# Patient Record
Sex: FEMALE | Race: WHITE | NOT HISPANIC OR LATINO | Employment: OTHER | URBAN - NONMETROPOLITAN AREA
[De-identification: names, ages, dates, MRNs, and addresses within clinical notes are randomized per-mention and may not be internally consistent; named-entity substitution may affect disease eponyms.]

---

## 2017-11-20 ENCOUNTER — TRANSCRIBE ORDERS (OUTPATIENT)
Dept: ADMINISTRATIVE | Facility: HOSPITAL | Age: 67
End: 2017-11-20

## 2017-11-20 DIAGNOSIS — Z12.31 ENCOUNTER FOR SCREENING MAMMOGRAM FOR MALIGNANT NEOPLASM OF BREAST: Primary | ICD-10-CM

## 2018-01-08 ENCOUNTER — HOSPITAL ENCOUNTER (OUTPATIENT)
Dept: MAMMOGRAPHY | Facility: HOSPITAL | Age: 68
Discharge: HOME | End: 2018-01-08
Attending: FAMILY MEDICINE | Admitting: FAMILY MEDICINE

## 2018-01-08 DIAGNOSIS — Z12.31 ENCOUNTER FOR SCREENING MAMMOGRAM FOR MALIGNANT NEOPLASM OF BREAST: ICD-10-CM

## 2018-01-08 PROCEDURE — 77063 BREAST TOMOSYNTHESIS BI: CPT

## 2018-01-08 PROCEDURE — 77067 SCR MAMMO BI INCL CAD: CPT

## 2019-09-18 ENCOUNTER — TRANSCRIBE ORDERS (OUTPATIENT)
Dept: ADMINISTRATIVE | Facility: HOSPITAL | Age: 69
End: 2019-09-18

## 2019-09-18 DIAGNOSIS — Z12.31 ENCOUNTER FOR SCREENING MAMMOGRAM FOR MALIGNANT NEOPLASM OF BREAST: Primary | ICD-10-CM

## 2019-09-19 ENCOUNTER — HOSPITAL ENCOUNTER (OUTPATIENT)
Dept: MAMMOGRAPHY | Facility: HOSPITAL | Age: 69
Discharge: HOME | End: 2019-09-19
Admitting: FAMILY MEDICINE

## 2019-09-19 DIAGNOSIS — Z12.31 ENCOUNTER FOR SCREENING MAMMOGRAM FOR MALIGNANT NEOPLASM OF BREAST: ICD-10-CM

## 2019-09-19 PROCEDURE — 77063 BREAST TOMOSYNTHESIS BI: CPT

## 2019-09-19 PROCEDURE — 77067 SCR MAMMO BI INCL CAD: CPT

## 2020-07-27 LAB
ALBUMIN SERPL-MCNC: 4.2 G/DL (ref 3.5–5.2)
ALP BLD-CCNC: 87 U/L (ref 35–104)
ALT SERPL-CCNC: 18 U/L (ref 5–33)
ANION GAP SERPL CALCULATED.3IONS-SCNC: 13 MMOL/L (ref 7–19)
AST SERPL-CCNC: 16 U/L (ref 5–32)
BASOPHILS ABSOLUTE: 0.1 K/UL (ref 0–0.2)
BASOPHILS RELATIVE PERCENT: 0.8 % (ref 0–1)
BILIRUB SERPL-MCNC: 0.3 MG/DL (ref 0.2–1.2)
BUN BLDV-MCNC: 20 MG/DL (ref 8–23)
CALCIUM SERPL-MCNC: 9.4 MG/DL (ref 8.8–10.2)
CHLORIDE BLD-SCNC: 103 MMOL/L (ref 98–111)
CHOLESTEROL, TOTAL: 139 MG/DL (ref 160–199)
CO2: 25 MMOL/L (ref 22–29)
CREAT SERPL-MCNC: 1 MG/DL (ref 0.5–0.9)
EOSINOPHILS ABSOLUTE: 0.2 K/UL (ref 0–0.6)
EOSINOPHILS RELATIVE PERCENT: 3.3 % (ref 0–5)
GFR AFRICAN AMERICAN: >59
GFR NON-AFRICAN AMERICAN: 55
GLUCOSE BLD-MCNC: 109 MG/DL (ref 74–109)
HCT VFR BLD CALC: 40.9 % (ref 37–47)
HDLC SERPL-MCNC: 43 MG/DL (ref 65–121)
HEMOGLOBIN: 13.2 G/DL (ref 12–16)
IMMATURE GRANULOCYTES #: 0 K/UL
LDL CHOLESTEROL CALCULATED: 63 MG/DL
LYMPHOCYTES ABSOLUTE: 2.6 K/UL (ref 1.1–4.5)
LYMPHOCYTES RELATIVE PERCENT: 35.5 % (ref 20–40)
MCH RBC QN AUTO: 29.8 PG (ref 27–31)
MCHC RBC AUTO-ENTMCNC: 32.3 G/DL (ref 33–37)
MCV RBC AUTO: 92.3 FL (ref 81–99)
MONOCYTES ABSOLUTE: 0.8 K/UL (ref 0–0.9)
MONOCYTES RELATIVE PERCENT: 11.4 % (ref 0–10)
NEUTROPHILS ABSOLUTE: 3.5 K/UL (ref 1.5–7.5)
NEUTROPHILS RELATIVE PERCENT: 48.7 % (ref 50–65)
PDW BLD-RTO: 12.4 % (ref 11.5–14.5)
PLATELET # BLD: 252 K/UL (ref 130–400)
PMV BLD AUTO: 10.4 FL (ref 9.4–12.3)
POTASSIUM SERPL-SCNC: 4 MMOL/L (ref 3.5–5)
RBC # BLD: 4.43 M/UL (ref 4.2–5.4)
SODIUM BLD-SCNC: 141 MMOL/L (ref 136–145)
T4 FREE: 1.25 NG/DL (ref 0.93–1.7)
TOTAL PROTEIN: 6.7 G/DL (ref 6.6–8.7)
TRIGL SERPL-MCNC: 167 MG/DL (ref 0–149)
TSH SERPL DL<=0.05 MIU/L-ACNC: 0.9 UIU/ML (ref 0.27–4.2)
VITAMIN D 25-HYDROXY: 35.9 NG/ML
WBC # BLD: 7.2 K/UL (ref 4.8–10.8)

## 2020-08-24 LAB
AMPHETAMINE SCREEN, URINE: NEGATIVE
BARBITURATE SCREEN URINE: NEGATIVE
BENZODIAZEPINE SCREEN, URINE: NEGATIVE
CANNABINOID SCREEN URINE: NEGATIVE
COCAINE METABOLITE SCREEN URINE: NEGATIVE
Lab: NORMAL
OPIATE SCREEN URINE: NEGATIVE

## 2021-01-16 ENCOUNTER — APPOINTMENT (OUTPATIENT)
Dept: VACCINE CLINIC | Facility: HOSPITAL | Age: 71
End: 2021-01-16

## 2021-01-29 ENCOUNTER — TRANSCRIBE ORDERS (OUTPATIENT)
Dept: ADMINISTRATIVE | Facility: HOSPITAL | Age: 71
End: 2021-01-29

## 2021-01-29 DIAGNOSIS — Z12.31 ENCOUNTER FOR SCREENING MAMMOGRAM FOR MALIGNANT NEOPLASM OF BREAST: Primary | ICD-10-CM

## 2021-02-02 ENCOUNTER — HOSPITAL ENCOUNTER (OUTPATIENT)
Dept: MAMMOGRAPHY | Facility: HOSPITAL | Age: 71
Discharge: HOME | End: 2021-02-02
Admitting: FAMILY MEDICINE

## 2021-02-02 DIAGNOSIS — Z12.31 ENCOUNTER FOR SCREENING MAMMOGRAM FOR MALIGNANT NEOPLASM OF BREAST: ICD-10-CM

## 2021-02-02 PROCEDURE — 77067 SCR MAMMO BI INCL CAD: CPT

## 2021-02-02 PROCEDURE — 77063 BREAST TOMOSYNTHESIS BI: CPT

## 2021-02-13 ENCOUNTER — APPOINTMENT (OUTPATIENT)
Dept: VACCINE CLINIC | Facility: HOSPITAL | Age: 71
End: 2021-02-13

## 2021-03-05 ENCOUNTER — VIRTUAL VISIT (OUTPATIENT)
Dept: GASTROENTEROLOGY | Age: 71
End: 2021-03-05
Payer: COMMERCIAL

## 2021-03-05 DIAGNOSIS — Z86.010 HISTORY OF COLONIC POLYPS: Primary | ICD-10-CM

## 2021-03-05 PROCEDURE — 99203 OFFICE O/P NEW LOW 30 MIN: CPT | Performed by: NURSE PRACTITIONER

## 2021-03-05 ASSESSMENT — ENCOUNTER SYMPTOMS
BLOOD IN STOOL: 0
NAUSEA: 0
COUGH: 0
TROUBLE SWALLOWING: 0
ANAL BLEEDING: 0
RECTAL PAIN: 0
DIARRHEA: 0
BACK PAIN: 0
SHORTNESS OF BREATH: 0
ABDOMINAL DISTENTION: 0
VOMITING: 0
CONSTIPATION: 0
ABDOMINAL PAIN: 0
VOICE CHANGE: 0
SORE THROAT: 0

## 2021-03-05 NOTE — PROGRESS NOTES
3/5/2021     Pt location: pt home    TELEHEALTH EVALUATION -- Audio/Visual (During [id number]-74 public health emergency)    HPI:    Carson Houston (:  1950) has requested an audio/video evaluation for the following concern(s):    Pt made an appt to schedule a surveillance colonoscopy. Has a personal hx of colon polyps. Pt reports that about a month ago she had an episode of lower abd pain that came on all the sudden, thinks it was related to something she ate, it lasted about 4 days, more of an uncomfortable feeling than a pain, resolved on its own with no intervention. No other GI lower GI complaints. Has occasional heartburn well controlled with TUMs prn, nothing daily or persistent. No other UGI complaints. GI scope reports in history per MA per OV policy, I reviewed this. Review of Systems   Constitutional: Negative for appetite change, fatigue, fever and unexpected weight change. HENT: Negative for sore throat, trouble swallowing and voice change. Respiratory: Negative for cough and shortness of breath. Cardiovascular: Negative for chest pain, palpitations and leg swelling. Gastrointestinal: Negative for abdominal distention, abdominal pain, anal bleeding, blood in stool, constipation, diarrhea, nausea, rectal pain and vomiting. Genitourinary: Negative for hematuria. Musculoskeletal: Negative for arthralgias, back pain and neck pain. Neurological: Negative for dizziness, weakness, light-headedness and headaches. Psychiatric/Behavioral: Negative for dysphoric mood and sleep disturbance. The patient is not nervous/anxious. All other systems reviewed and are negative. Prior to Visit Medications    Medication Sig Taking? Authorizing Provider   HYDROcodone-acetaminophen (LORTAB)  MG per tablet Take 1 tablet by mouth every 6 hours as needed for Pain. Historical Provider, MD   losartan (COZAAR) 100 MG tablet Take 100 mg by mouth daily.   Historical Provider, MD levothyroxine (SYNTHROID) 100 MCG tablet Take 100 mcg by mouth Daily. Historical Provider, MD   rosuvastatin (CRESTOR) 5 MG tablet Take 5 mg by mouth daily. Historical Provider, MD   Cetirizine HCl (ZYRTEC ALLERGY) 10 MG CAPS Take  by mouth. Historical Provider, MD   Cyanocobalamin (VITAMIN B 12) 100 MCG LOZG Take by mouth daily   Historical Provider, MD       Social History     Tobacco Use    Smoking status: Former Smoker     Quit date: 1990     Years since quittin.6    Smokeless tobacco: Never Used   Substance Use Topics    Alcohol use: Yes     Comment: social    Drug use: Never        PHYSICAL EXAMINATION:  [ INSTRUCTIONS:  \"[x]\" Indicates a positive item  \"[]\" Indicates a negative item  -- DELETE ALL ITEMS NOT EXAMINED]  Vital Signs: (As obtained by patient/caregiver or practitioner observation)    Blood pressure-  Heart rate-    Respiratory rate-    Temperature-  Pulse oximetry-     Constitutional: [x] Appears well-developed and well-nourished [x] No apparent distress      [] Abnormal-   Mental status  [x] Alert and awake  [x] Oriented to person/place/time [x]Able to follow commands      Eyes:  EOM    [x]  Normal  [] Abnormal-  Sclera  [x]  Normal  [] Abnormal -         Discharge [x]  None visible  [] Abnormal -    HENT:   [x] Normocephalic, atraumatic.   [] Abnormal   [x] Mouth/Throat: Mucous membranes are moist.     External Ears [x] Normal  [] Abnormal-     Neck: [x] No visualized mass     Pulmonary/Chest: [x] Respiratory effort normal.  [x] No visualized signs of difficulty breathing or respiratory distress        [] Abnormal-      Musculoskeletal:   [] Normal gait with no signs of ataxia         [x] Normal range of motion of neck        [] Abnormal-       Neurological:        [x] No Facial Asymmetry (Cranial nerve 7 motor function) (limited exam to video visit)          [x] No gaze palsy        [] Abnormal-         Skin:        [x] No significant exanthematous lesions or discoloration noted

## 2021-03-05 NOTE — PATIENT INSTRUCTIONS
You are going to have a colonoscopy and here are some instructions: You will be given specific directions regarding restrictions to diet and bowel prep instructions including laxatives. Please read these instructions one week prior to your scheduled procedure to ensure that you are prepared. Follow prep instructions provided for bowel prep. Take all of the bowel prep as directed. If you are having problems with nausea, stop your prep for 30-45 min to allow the nausea to subside before resuming your prep. Nothing to eat or drink after midnight the day of the procedure EXCEPT:  PLEASE TAKE MEDICATION(S) FOR HIGH BLOOD PRESSURE, THYROID, SEIZURES, AND HEART THE MORNING OF THE PROCEDURE WITH A SIP OF WATER  AT LEAST 2 HOURS PRIOR TO ARRIVAL TIME.   YOU MAY ALSO TAKE ANY INHALERS YOU ARE PRESCRIBED. You will not be able to drive for 24 hours after the procedure due to sedation. Bring a  with you the day of the procedure. No aspirin, ibuprofen, naproxen, fish oil or vitamin E for 5 days before procedure. If you are on blood thinners, clearance from the prescribing physician will be obtained before your procedure is scheduled. Increased [email or web address] may be associated with discontinuation of your blood thinner and include, but not limited to, stroke, TIA, or cardiac event. If polyps are removed during the procedure they will be sent to a pathologist for analysis. You will be notified by mail of the pathology results in 2-3 weeks. Your physician may also schedule a follow up appointment with the nurse practitioner to discuss pathology, symptoms or to check if you have had any problems related to your procedure. If you prefer not to return to the office after your procedure please discuss this with your physician on the day of your colonoscopy. Final recommendations are based on the pathologist report. Your diet before a colonoscopy bowel preparation is very important to ensure a successful colon exam. It is recommended to consider certain changes to your diet three to four days prior to the procedure. Remember that your bowels need to be empty for the exam.    What foods are good to eat? Cut down on heavy solid foods three to four days before the procedure and start introducing lighter meals to your diet. The following food suggestions are a good part of your diet before a colonoscopy bowel preparation. Light meat that is easily digestible such as chicken (without the skin)   Potatoes without skin   Cheese   Eggs   A light meal of steamed white fish   Light clear soups    Foods and drinks to avoid  Avoid foods that contain too much fiber. Stay clear of dark colored beverages. They can stick to the walls of the digestive tract and make it difficult to differentiate from blood. Some of these foods are:  Red meat, rice, nuts and vegetables   Milk, other milk based fluids and cream   Most fruit and puddings   Whole grain pasta   Cereals, bran and seeds   Colored beverages, especially those that are red or purple in color   Red colored Jell-O   On the day before the colonoscopy, continue to drink plenty of clear fluids. It is important   to keep yourself hydrated before the exam.     Please follow all instructions as provided for cleansing the bowel. Failure to have an adequately prepped colon may cause you to have incomplete exam with further testing required.

## 2021-03-19 ENCOUNTER — OFFICE VISIT (OUTPATIENT)
Age: 71
End: 2021-03-19

## 2021-03-19 VITALS — OXYGEN SATURATION: 96 % | HEART RATE: 72 BPM

## 2021-03-19 DIAGNOSIS — Z11.59 SCREENING FOR VIRAL DISEASE: Primary | ICD-10-CM

## 2021-03-19 LAB — SARS-COV-2, PCR: NOT DETECTED

## 2021-03-19 PROCEDURE — 99999 PR OFFICE/OUTPT VISIT,PROCEDURE ONLY: CPT | Performed by: NURSE PRACTITIONER

## 2021-03-23 ENCOUNTER — APPOINTMENT (OUTPATIENT)
Dept: OPERATING ROOM | Age: 71
End: 2021-03-23

## 2021-03-23 ENCOUNTER — HOSPITAL ENCOUNTER (OUTPATIENT)
Age: 71
Setting detail: SPECIMEN
Discharge: HOME | End: 2021-03-23
Payer: COMMERCIAL

## 2021-03-23 ENCOUNTER — ANESTHESIA EVENT (OUTPATIENT)
Dept: OPERATING ROOM | Age: 71
End: 2021-03-23

## 2021-03-23 ENCOUNTER — HOSPITAL ENCOUNTER (OUTPATIENT)
Age: 71
Setting detail: OUTPATIENT SURGERY
Discharge: HOME | End: 2021-03-23
Attending: INTERNAL MEDICINE | Admitting: INTERNAL MEDICINE
Payer: COMMERCIAL

## 2021-03-23 ENCOUNTER — ANESTHESIA (OUTPATIENT)
Dept: OPERATING ROOM | Age: 71
End: 2021-03-23

## 2021-03-23 VITALS
OXYGEN SATURATION: 99 % | WEIGHT: 192 LBS | TEMPERATURE: 97.6 F | HEIGHT: 66 IN | BODY MASS INDEX: 30.86 KG/M2 | DIASTOLIC BLOOD PRESSURE: 59 MMHG | RESPIRATION RATE: 20 BRPM | HEART RATE: 59 BPM | SYSTOLIC BLOOD PRESSURE: 107 MMHG

## 2021-03-23 VITALS — OXYGEN SATURATION: 99 % | SYSTOLIC BLOOD PRESSURE: 93 MMHG | DIASTOLIC BLOOD PRESSURE: 44 MMHG

## 2021-03-23 PROCEDURE — 45380 COLONOSCOPY AND BIOPSY: CPT | Performed by: INTERNAL MEDICINE

## 2021-03-23 PROCEDURE — 45380 COLONOSCOPY AND BIOPSY: CPT

## 2021-03-23 PROCEDURE — 88305 TISSUE EXAM BY PATHOLOGIST: CPT

## 2021-03-23 RX ORDER — M-VIT,TX,IRON,MINS/CALC/FOLIC 27MG-0.4MG
1 TABLET ORAL DAILY
COMMUNITY

## 2021-03-23 RX ORDER — PROPOFOL 10 MG/ML
INJECTION, EMULSION INTRAVENOUS PRN
Status: DISCONTINUED | OUTPATIENT
Start: 2021-03-23 | End: 2021-03-23 | Stop reason: SDUPTHER

## 2021-03-23 RX ORDER — LIDOCAINE HYDROCHLORIDE 10 MG/ML
INJECTION, SOLUTION INFILTRATION; PERINEURAL PRN
Status: DISCONTINUED | OUTPATIENT
Start: 2021-03-23 | End: 2021-03-23 | Stop reason: SDUPTHER

## 2021-03-23 RX ORDER — SODIUM CHLORIDE 9 MG/ML
INJECTION, SOLUTION INTRAVENOUS CONTINUOUS
Status: DISCONTINUED | OUTPATIENT
Start: 2021-03-23 | End: 2021-03-23 | Stop reason: HOSPADM

## 2021-03-23 RX ADMIN — LIDOCAINE HYDROCHLORIDE 30 MG: 10 INJECTION, SOLUTION INFILTRATION; PERINEURAL at 08:55

## 2021-03-23 RX ADMIN — SODIUM CHLORIDE: 9 INJECTION, SOLUTION INTRAVENOUS at 08:33

## 2021-03-23 RX ADMIN — PROPOFOL 150 MG: 10 INJECTION, EMULSION INTRAVENOUS at 08:55

## 2021-03-23 NOTE — H&P
Patient Name: Joel Guzmán  : 1950  MRN: [id number]  DATE: 21    Allergies: No Known Allergies     ENDOSCOPY  History and Physical    Procedure:    [] Diagnostic Colonoscopy       [] Screening Colonoscopy  [] EGD      [] ERCP      [] EUS       [] Other    [x] Previous office notes/History and Physical reviewed from the patients chart. Please see EMR for further details of HPI. I have examined the patient's status immediately prior to the procedure and:      Indications/HPI:    []Abdominal Pain   []Cancer- GI/Lung     []Fhx of colon CA/polyps  []History of Polyps  []Barretts            []Melena  []Abnormal Imaging              []Dysphagia              []Persistent Pneumonia   []Anemia                            []Food Impaction        [x]History of Polyps  [] GI Bleed             []Pulmonary nodule/Mass   []Change in bowel habits []Heartburn/Reflux  []Rectal Bleed (BRBPR)  []Chest Pain - Non Cardiac []Heme (+) Stool []Ulcers  []Constipation  []Hemoptysis  []Varices  []Diarrhea  []Hypoxemia    []Nausea/Vomiting   [x]Screening   []Crohns/Colitis  []Other:     Anesthesia:   [x] MAC [] Moderate Sedation   [] General   [] None     ROS: 12 pt Review of Symptoms was negative unless mentioned above    Medications:   Prior to Admission medications    Medication Sig Start Date End Date Taking? Authorizing Provider   Multiple Vitamins-Minerals (THERAPEUTIC MULTIVITAMIN-MINERALS) tablet Take 1 tablet by mouth daily   Yes Historical Provider, MD   HYDROcodone-acetaminophen (LORTAB)  MG per tablet Take 1 tablet by mouth every 6 hours as needed for Pain. Historical Provider, MD   losartan (COZAAR) 100 MG tablet Take 100 mg by mouth daily. Historical Provider, MD   levothyroxine (SYNTHROID) 100 MCG tablet Take 100 mcg by mouth Daily. Historical Provider, MD   rosuvastatin (CRESTOR) 5 MG tablet Take 5 mg by mouth daily.     Historical Provider, MD   Cetirizine HCl (ZYRTEC ALLERGY) 10 MG CAPS Take  by 2

## 2021-03-23 NOTE — ANESTHESIA POSTPROCEDURE EVALUATION
Department of Anesthesiology  Postprocedure Note    Patient: Zhanna Willams  MRN: [id number]  YOB: 1950  Date of evaluation: 3/23/2021  Time:  9:11 AM     Procedure Summary     Date: 03/23/21 Room / Location: [facility] ENDO 02 / [address] [place] [address]    Anesthesia Start: 3128 Anesthesia Stop:     Procedure: COLONOSCOPY POLYPECTOMY SNARE/COLD BIOPSY (N/A Abdomen) Diagnosis: (SCREENING)    Surgeons: Ravindra Jaffe MD Responsible Provider: DOUG Tatum CRNA    Anesthesia Type: Not recorded ASA Status: Not recorded          Anesthesia Type: No value filed. Lee Phase I:      Lee Phase II:      Last vitals: Reviewed and per EMR flowsheets.        Anesthesia Post Evaluation    Patient location during evaluation: bedside  Patient participation: complete - patient participated  Level of consciousness: sleepy but conscious  Airway patency: patent  Nausea & Vomiting: no nausea and no vomiting  Complications: no  Cardiovascular status: blood pressure returned to baseline  Respiratory status: acceptable, room air and spontaneous ventilation  Hydration status: euvolemic

## 2021-03-23 NOTE — ANESTHESIA PRE PROCEDURE
Department of Anesthesiology  Preprocedure Note       Name:  Edwin Renee   Age:  65 y.o.  :  1950                                          MRN:  [id number]         Date:  3/23/2021      Surgeon: Dania Rosenthal):  Simin Porter MD    Procedure: Procedure(s):  COLONOSCOPY POLYPECTOMY SNARE/COLD BIOPSY    Medications prior to admission:   Prior to Admission medications    Medication Sig Start Date End Date Taking? Authorizing Provider   Multiple Vitamins-Minerals (THERAPEUTIC MULTIVITAMIN-MINERALS) tablet Take 1 tablet by mouth daily   Yes Historical Provider, MD   HYDROcodone-acetaminophen (LORTAB)  MG per tablet Take 1 tablet by mouth every 6 hours as needed for Pain. Historical Provider, MD   losartan (COZAAR) 100 MG tablet Take 100 mg by mouth daily. Historical Provider, MD   levothyroxine (SYNTHROID) 100 MCG tablet Take 100 mcg by mouth Daily. Historical Provider, MD   rosuvastatin (CRESTOR) 5 MG tablet Take 5 mg by mouth daily. Historical Provider, MD   Cetirizine HCl (ZYRTEC ALLERGY) 10 MG CAPS Take  by mouth.     Historical Provider, MD   Cyanocobalamin (VITAMIN B 12) 100 MCG LOZG Take by mouth daily     Historical Provider, MD       Current medications:    Current Facility-Administered Medications   Medication Dose Route Frequency Provider Last Rate Last Admin    0.9 % sodium chloride infusion   Intravenous Continuous Simin Porter  mL/hr at 21 0833 New Bag at 21 0930       Allergies:  No Known Allergies    Problem List:    Patient Active Problem List   Diagnosis Code    Diverticulosis K57.90    History of colonic polyps Z86.010       Past Medical History:        Diagnosis Date    Chronic kidney disease     Colon polyps     Constipation     Diverticulosis     Hyperlipidemia     Hypertension     Hypothyroidism        Past Surgical History:        Procedure Laterality Date    [place]    COLONOSCOPY  2014 Kristina:  HP,  5y recall    JOINT REPLACEMENT Left     2014 hip    TUBAL LIGATION         Social History:    Social History     Tobacco Use    Smoking status: Former Smoker     Quit date: 1990     Years since quittin.7    Smokeless tobacco: Never Used   Substance Use Topics    Alcohol use: Not Currently     Comment: social                                Counseling given: Not Answered      Vital Signs (Current):   Vitals:    21 0830   BP: (!) 120/55   Pulse: 61   Resp: 20   Temp: 97.6 °F (36.4 °C)   TempSrc: Temporal   SpO2: 100%   Weight: 192 lb (87.1 kg)   Height: 5' 6\" (1.676 m)                                              BP Readings from Last 3 Encounters:   21 (!) 120/55   21 (!) 93/44   14 130/82       NPO Status: Time of last liquid consumption: 0500                        Time of last solid consumption: 1800                        Date of last liquid consumption: 21                        Date of last solid food consumption: 21    BMI:   Wt Readings from Last 3 Encounters:   21 192 lb (87.1 kg)   14 172 lb 4.8 oz (78.2 kg)     Body mass index is 30.99 kg/m². CBC:   Lab Results   Component Value Date    WBC 7.2 2020    RBC 4.43 2020    HGB 13.2 2020    HCT 40.9 2020    MCV 92.3 2020    RDW 12.4 2020     2020       CMP:   Lab Results   Component Value Date     2020    K 4.0 2020     2020    CO2 25 2020    BUN 20 2020    CREATININE 1.0 2020    GFRAA >59 2020    LABGLOM 55 2020    GLUCOSE 109 2020    PROT 6.7 2020    CALCIUM 9.4 2020    BILITOT 0.3 2020    ALKPHOS 87 2020    AST 16 2020    ALT 18 2020       POC Tests: No results for input(s): POCGLU, POCNA, POCK, POCCL, POCBUN, POCHEMO, POCHCT in the last 72 hours.     Coags:   Lab Results   Component Value Date    PROTIME 14.1 2014    INR 1.14 02/20/2014       HCG (If Applicable): No results found for: PREGTESTUR, PREGSERUM, HCG, HCGQUANT     ABGs: No results found for: PHART, PO2ART, BEW4MXA, MXK6JJX, BEART, X8WDWFQJ     Type & Screen (If Applicable):  No results found for: LABABO, LABRH    Drug/Infectious Status (If Applicable):  No results found for: HIV, HEPCAB    COVID-19 Screening (If Applicable):   Lab Results   Component Value Date    COVID19 Not Detected 03/19/2021           Anesthesia Evaluation  Patient summary reviewed and Nursing notes reviewed  Airway: Mallampati: II  TM distance: >3 FB   Neck ROM: full  Mouth opening: > = 3 FB Dental: normal exam         Pulmonary:Negative Pulmonary ROS and normal exam                               Cardiovascular:    (+) hypertension:,                   Neuro/Psych:   Negative Neuro/Psych ROS              GI/Hepatic/Renal: Neg GI/Hepatic/Renal ROS            Endo/Other:    (+) hypothyroidism::., .                 Abdominal:           Vascular: negative vascular ROS. Anesthesia Plan      general and TIVA     ASA 2       Induction: intravenous. Anesthetic plan and risks discussed with patient. Plan discussed with CRNA.                   DOUG Batista - LV   3/23/2021

## 2021-03-23 NOTE — OP NOTE
Patient: Colin Crane : 1950  Med Rec#: [id number] Acc#: [id number]   Primary Care Provider Lanre Tavarez MD    Date of Procedure:  3/23/2021    Endoscopist: Laura Tobin MD    Referring Provider: Lanre Tavarez MD,     Operation Performed: Colonoscopy up to the cecum with cold biopsies of small rectal polyps    Indications: History of colonic polyps    Anesthesia:  Sedation was administered by anesthesia who monitored the patient during the procedure. I met with Colin Crane prior to procedure. We discussed the procedure itself, and I have discussed the risks of endoscopy (including-- but not limited to-- pain, discomfort, bleeding potentially requiring second endoscopic procedure and/or blood transfusion, organ perforation requiring operative repair, damage to organs near the colon, infection, aspiration, cardiopulmonary/allergic reaction), benefits, indications to endoscopy. Additionally, we discussed options other than colonoscopy. The patient expressed understanding. All questions answered. The patient decided to proceed with the procedure. Signed informed consent was placed on the chart. Blood Loss: minimal    Withdrawal time: More than 6 minutes  Bowel Prep: adequate and good    Complications: no immediate complications    DESCRIPTION OF PROCEDURE:     A time out was performed. After written informed consent was obtained, the patient was placed in the left lateral position. The perianal area was inspected, and a digital rectal exam was performed. A rectal exam was performed: normal tone, no palpable lesions. At this point, a forward viewing Olympus colonoscope was inserted into the anus and carefully advanced to the cecum. The cecum was identified by the ileocecal valve and the appendiceal orifice. The colonoscope was then slowly withdrawn with careful inspection of the mucosa in a linear and circumferential fashion. The scope was retroflexed in the rectum.

## 2021-10-05 LAB
ALBUMIN SERPL-MCNC: 4.5 G/DL (ref 3.5–5.2)
ALP BLD-CCNC: 98 U/L (ref 35–104)
ALT SERPL-CCNC: 11 U/L (ref 5–33)
ANION GAP SERPL CALCULATED.3IONS-SCNC: 11 MMOL/L (ref 7–19)
AST SERPL-CCNC: 14 U/L (ref 5–32)
BASOPHILS ABSOLUTE: 0.1 K/UL (ref 0–0.2)
BASOPHILS RELATIVE PERCENT: 1.1 % (ref 0–1)
BILIRUB SERPL-MCNC: 0.5 MG/DL (ref 0.2–1.2)
BUN BLDV-MCNC: 18 MG/DL (ref 8–23)
CALCIUM SERPL-MCNC: 10 MG/DL (ref 8.8–10.2)
CHLORIDE BLD-SCNC: 104 MMOL/L (ref 98–111)
CHOLESTEROL, TOTAL: 172 MG/DL (ref 160–199)
CO2: 28 MMOL/L (ref 22–29)
CREAT SERPL-MCNC: 1.1 MG/DL (ref 0.5–0.9)
EOSINOPHILS ABSOLUTE: 0.2 K/UL (ref 0–0.6)
EOSINOPHILS RELATIVE PERCENT: 3.6 % (ref 0–5)
GFR AFRICAN AMERICAN: 59
GFR NON-AFRICAN AMERICAN: 49
GLUCOSE BLD-MCNC: 94 MG/DL (ref 74–109)
HBA1C MFR BLD: 5.8 % (ref 4–6)
HCT VFR BLD CALC: 43.1 % (ref 37–47)
HDLC SERPL-MCNC: 56 MG/DL (ref 65–121)
HEMOGLOBIN: 13.4 G/DL (ref 12–16)
IMMATURE GRANULOCYTES #: 0 K/UL
LDL CHOLESTEROL CALCULATED: 97 MG/DL
LYMPHOCYTES ABSOLUTE: 2.2 K/UL (ref 1.1–4.5)
LYMPHOCYTES RELATIVE PERCENT: 35.6 % (ref 20–40)
MCH RBC QN AUTO: 29.6 PG (ref 27–31)
MCHC RBC AUTO-ENTMCNC: 31.1 G/DL (ref 33–37)
MCV RBC AUTO: 95.1 FL (ref 81–99)
MONOCYTES ABSOLUTE: 0.5 K/UL (ref 0–0.9)
MONOCYTES RELATIVE PERCENT: 8.8 % (ref 0–10)
NEUTROPHILS ABSOLUTE: 3.1 K/UL (ref 1.5–7.5)
NEUTROPHILS RELATIVE PERCENT: 50.6 % (ref 50–65)
PDW BLD-RTO: 12.8 % (ref 11.5–14.5)
PLATELET # BLD: 234 K/UL (ref 130–400)
PMV BLD AUTO: 10.8 FL (ref 9.4–12.3)
POTASSIUM SERPL-SCNC: 5.2 MMOL/L (ref 3.5–5)
RBC # BLD: 4.53 M/UL (ref 4.2–5.4)
SODIUM BLD-SCNC: 143 MMOL/L (ref 136–145)
T4 FREE: 1.05 NG/DL (ref 0.93–1.7)
TOTAL PROTEIN: 6.6 G/DL (ref 6.6–8.7)
TRIGL SERPL-MCNC: 96 MG/DL (ref 0–149)
TSH SERPL DL<=0.05 MIU/L-ACNC: 10.32 UIU/ML (ref 0.27–4.2)
WBC # BLD: 6.1 K/UL (ref 4.8–10.8)

## 2022-04-01 ENCOUNTER — TRANSCRIBE ORDERS (OUTPATIENT)
Dept: ADMINISTRATIVE | Facility: HOSPITAL | Age: 72
End: 2022-04-01

## 2022-04-01 DIAGNOSIS — Z12.31 ENCOUNTER FOR SCREENING MAMMOGRAM FOR MALIGNANT NEOPLASM OF BREAST: Primary | ICD-10-CM

## 2022-04-08 ENCOUNTER — HOSPITAL ENCOUNTER (OUTPATIENT)
Dept: MAMMOGRAPHY | Facility: HOSPITAL | Age: 72
Discharge: HOME | End: 2022-04-08
Admitting: FAMILY MEDICINE

## 2022-04-08 DIAGNOSIS — Z12.31 ENCOUNTER FOR SCREENING MAMMOGRAM FOR MALIGNANT NEOPLASM OF BREAST: ICD-10-CM

## 2022-04-08 PROCEDURE — 77063 BREAST TOMOSYNTHESIS BI: CPT

## 2022-04-08 PROCEDURE — 77067 SCR MAMMO BI INCL CAD: CPT

## 2022-06-30 ENCOUNTER — OFFICE VISIT (OUTPATIENT)
Dept: FAMILY MEDICINE CLINIC | Facility: CLINIC | Age: 72
End: 2022-06-30

## 2022-06-30 VITALS
HEART RATE: 86 BPM | BODY MASS INDEX: 30.7 KG/M2 | OXYGEN SATURATION: 98 % | SYSTOLIC BLOOD PRESSURE: 116 MMHG | DIASTOLIC BLOOD PRESSURE: 60 MMHG | TEMPERATURE: 98.6 F | RESPIRATION RATE: 18 BRPM | HEIGHT: 66 IN | WEIGHT: 191 LBS

## 2022-06-30 DIAGNOSIS — R53.83 FATIGUE, UNSPECIFIED TYPE: Primary | ICD-10-CM

## 2022-06-30 DIAGNOSIS — H65.02 ACUTE SEROUS OTITIS MEDIA OF LEFT EAR, RECURRENCE NOT SPECIFIED: ICD-10-CM

## 2022-06-30 LAB
EXPIRATION DATE: NORMAL
FLUAV AG UPPER RESP QL IA.RAPID: NOT DETECTED
FLUBV AG UPPER RESP QL IA.RAPID: NOT DETECTED
INTERNAL CONTROL: NORMAL
Lab: NORMAL
SARS-COV-2 AG UPPER RESP QL IA.RAPID: NOT DETECTED

## 2022-06-30 PROCEDURE — 99203 OFFICE O/P NEW LOW 30 MIN: CPT | Performed by: NURSE PRACTITIONER

## 2022-06-30 PROCEDURE — 87428 SARSCOV & INF VIR A&B AG IA: CPT | Performed by: NURSE PRACTITIONER

## 2022-06-30 RX ORDER — ROSUVASTATIN CALCIUM 5 MG/1
TABLET, COATED ORAL
COMMUNITY
Start: 2022-04-20

## 2022-06-30 RX ORDER — LOSARTAN POTASSIUM AND HYDROCHLOROTHIAZIDE 25; 100 MG/1; MG/1
TABLET ORAL
COMMUNITY
Start: 2022-05-25

## 2022-06-30 RX ORDER — LEVOTHYROXINE SODIUM 150 MCG
TABLET ORAL
COMMUNITY
Start: 2022-06-06

## 2022-06-30 RX ORDER — AMOXICILLIN 500 MG/1
500 CAPSULE ORAL 2 TIMES DAILY
Qty: 20 CAPSULE | Refills: 0 | Status: SHIPPED | OUTPATIENT
Start: 2022-06-30 | End: 2022-07-10

## 2022-06-30 RX ORDER — HYDROCODONE BITARTRATE AND ACETAMINOPHEN 10; 325 MG/1; MG/1
TABLET ORAL
COMMUNITY
Start: 2022-06-06

## 2022-06-30 NOTE — PROGRESS NOTES
Chief Complaint  Earache (Pt c/o pain in left ear x 1 week. ) and Fatigue    Subjective        Shani Muir presents to [facility]  Has been sick since Sunday, hot then cold, left ear ache, non productive cough, no energy and congestion.  Has not taken anything otc due to her kidney problems.   Denies loss of taste or smell, but decreased appetite since Monday.  Denies nausea, vomiting.     Earache   There is pain in the left ear. This is a new problem. The current episode started in the past 7 days. The problem occurs constantly. The problem has been gradually worsening. There has been no fever. The pain is at a severity of 4/10. The pain is mild. Associated symptoms include coughing. Pertinent negatives include no ear discharge, headaches, hearing loss, neck pain, sore throat or vomiting. She has tried nothing for the symptoms. The treatment provided no relief.   URI   This is a new problem. The current episode started in the past 7 days. The problem has been gradually worsening. There has been no fever. Associated symptoms include coughing and ear pain. Pertinent negatives include no chest pain, headaches, joint pain, joint swelling, nausea, neck pain, sinus pain, sneezing, sore throat or vomiting. She has tried nothing for the symptoms.   Fatigue  This is a new problem. The current episode started in the past 7 days. The problem occurs constantly. The problem has been gradually worsening. Associated symptoms include coughing and fatigue. Pertinent negatives include no arthralgias, change in bowel habit, chest pain, headaches, joint swelling, nausea, neck pain, sore throat or vomiting. Nothing aggravates the symptoms. She has tried nothing for the symptoms.     The following portions of the patient's history were reviewed and updated as appropriate: allergies, current medications, past family history, past medical history, past social history, past surgical history and problem  "list.    Objective   Vital Signs:  /60 (BP Location: Left arm, Patient Position: Sitting, Cuff Size: Adult)   Pulse 86   Temp 98.6 °F (37 °C) (Infrared)   Resp 18   Ht 167.6 cm (66\") Comment: per patient  Wt 86.6 kg (191 lb)   SpO2 98%   BMI 30.83 kg/m²   Estimated body mass index is 30.83 kg/m² as calculated from the following:    Height as of this encounter: 167.6 cm (66\").    Weight as of this encounter: 86.6 kg (191 lb).    BMI is >= 30 and <35. (Class 1 Obesity). The following options were offered after discussion;: weight loss educational material (shared in after visit summary)      Physical Exam  Vitals and nursing note reviewed.   Constitutional:       General: She is awake.      Appearance: Normal appearance. She is well-developed and well-groomed. She is ill-appearing.   HENT:      Head: Normocephalic and atraumatic.      Right Ear: Hearing, tympanic membrane, ear canal and external ear normal.      Left Ear: Hearing normal. Tympanic membrane is injected and bulging.      Nose: Congestion present.      Right Sinus: No maxillary sinus tenderness or frontal sinus tenderness.      Left Sinus: No maxillary sinus tenderness or frontal sinus tenderness.      Mouth/Throat:      Lips: Pink.      Pharynx: Oropharynx is clear.   Eyes:      General: Lids are normal.      Conjunctiva/sclera: Conjunctivae normal.   Cardiovascular:      Rate and Rhythm: Normal rate and regular rhythm.      Heart sounds: Normal heart sounds.   Pulmonary:      Effort: Pulmonary effort is normal.      Breath sounds: Normal breath sounds and air entry.   Musculoskeletal:      Cervical back: Full passive range of motion without pain.      Right lower leg: No edema.      Left lower leg: No edema.   Lymphadenopathy:      Head:      Right side of head: No submental, submandibular or tonsillar adenopathy.      Left side of head: No submental, submandibular or tonsillar adenopathy.   Skin:     General: Skin is warm and dry. "   Neurological:      Mental Status: She is alert and oriented to person, place, and time.      Sensory: Sensation is intact.      Motor: Motor function is intact.      Coordination: Coordination is intact.      Gait: Gait is intact.   Psychiatric:         Attention and Perception: Attention and perception normal.         Mood and Affect: Mood and affect normal.         Speech: Speech normal.         Behavior: Behavior normal. Behavior is cooperative.         Thought Content: Thought content normal.         Cognition and Memory: Cognition and memory normal.         Judgment: Judgment normal.        Result Review :                Assessment and Plan   Diagnoses and all orders for this visit:    1. Fatigue, unspecified type (Primary)  -     POCT SARS-CoV-2 Antigen TAMARA + Flu    2. Acute serous otitis media of left ear, recurrence not specified  -     amoxicillin (AMOXIL) 500 MG capsule; Take 1 capsule by mouth 2 (Two) Times a Day for 10 days.  Dispense: 20 capsule; Refill: 0        POCT SARS-CoV-2 Antigen TAMARA + Flu  Order: [id number]   Status: Final result     Visible to patient: No (scheduled for 7/1/2022  1:07 AM)     Next appt: None     Dx: Fatigue, unspecified type    Specimen Information: Swab         0 Result Notes    Component   Ref Range & Units 11:06    SARS Antigen   Not Detected, Presumptive Negative Not Detected    Influenza A Antigen TAMARA   Not Detected Not Detected    Influenza B Antigen TAMARA   Not Detected Not Detected    Internal Control   Passed Passed    Lot Nu    POCT SARS-CoV-2 Antigen TAMARA + Flu  Order: [id number]   Status: Final result     Visible to patient: No (scheduled for 7/1/2022  1:07 AM)     Next appt: None     Dx: Fatigue, unspecified type    Specimen Information: Swab         0 Result Notes    Component   Ref Range & Units 11:06    SARS Antigen   Not Detected, Presumptive Negative Not Detected    Influenza A Antigen TAMARA   Not Detected Not Detected    Influenza B Antigen TAMARA   Not Detected Not  Detected    Internal Control   Passed Passed    Lot Number  [id number]    Expiration Date  2,042,023               Specimen Collected: 06/30/22 11:06 Last Resulted: 06/30/22 11:06           mber  [id number]    Expiration Date  2,042,023               Specimen Collected: 06/30/22 11:06 Last Resulted: 06/30/22 11:06                  I spent 14 minutes caring for Shani on this date of service. This time includes time spent by me in the following activities:preparing for the visit, performing a medically appropriate examination and/or evaluation , counseling and educating the patient/family/caregiver, ordering medications, tests, or procedures, documenting information in the medical record and care coordination  Follow Up   Return in about 1 week (around 7/7/2022), or if symptoms worsen or fail to improve.  Patient was given instructions and counseling regarding her condition or for health maintenance advice. Please see specific information pulled into the AVS if appropriate.     Electronically signed by Sunshine Jackson DNP, APRN, 06/30/22, 11:39 AM CDT.

## 2022-08-09 ENCOUNTER — OFFICE VISIT (OUTPATIENT)
Dept: FAMILY MEDICINE CLINIC | Facility: CLINIC | Age: 72
End: 2022-08-09

## 2022-08-09 VITALS
OXYGEN SATURATION: 99 % | SYSTOLIC BLOOD PRESSURE: 112 MMHG | HEART RATE: 109 BPM | TEMPERATURE: 98.6 F | HEIGHT: 66 IN | RESPIRATION RATE: 20 BRPM | BODY MASS INDEX: 30.86 KG/M2 | DIASTOLIC BLOOD PRESSURE: 76 MMHG | WEIGHT: 192 LBS

## 2022-08-09 DIAGNOSIS — R05.9 COUGH: ICD-10-CM

## 2022-08-09 DIAGNOSIS — J20.9 ACUTE BRONCHITIS, UNSPECIFIED ORGANISM: Primary | ICD-10-CM

## 2022-08-09 DIAGNOSIS — J01.00 ACUTE NON-RECURRENT MAXILLARY SINUSITIS: ICD-10-CM

## 2022-08-09 PROCEDURE — 99213 OFFICE O/P EST LOW 20 MIN: CPT | Performed by: NURSE PRACTITIONER

## 2022-08-09 PROCEDURE — 87428 SARSCOV & INF VIR A&B AG IA: CPT | Performed by: NURSE PRACTITIONER

## 2022-08-09 RX ORDER — AZITHROMYCIN 250 MG/1
TABLET, FILM COATED ORAL
Qty: 6 TABLET | Refills: 0 | Status: SHIPPED | OUTPATIENT
Start: 2022-08-09

## 2022-08-09 RX ORDER — DEXTROMETHORPHAN HYDROBROMIDE AND PROMETHAZINE HYDROCHLORIDE 15; 6.25 MG/5ML; MG/5ML
5 SYRUP ORAL NIGHTLY PRN
Qty: 118 ML | Refills: 0 | Status: SHIPPED | OUTPATIENT
Start: 2022-08-09

## 2022-08-09 RX ORDER — BENZONATATE 100 MG/1
100 CAPSULE ORAL 3 TIMES DAILY PRN
Qty: 21 CAPSULE | Refills: 0 | Status: SHIPPED | OUTPATIENT
Start: 2022-08-09

## 2022-08-09 NOTE — PROGRESS NOTES
"Chief Complaint  Cough and Nasal Congestion    Subjective        Shani Muir presents to [facility]  History of Present Illness     Shani Muir is a 71-year-old female who present to the clinic today with complaint of a cough and nasal congestion. She is accompanied by an adult male.    The patient reports she started feeling bad on Sunday, 08/07/2022. She is experiencing sinus pressure that is so bad she cannot where her glasses. She has sinus drainage down her throat and the unknown males states that she has a terrible cough coming from down deep. Her cough was so bad last night that she could not rest. She mentions that she is extremely fatigued. She denies any dyspnea or wheezing. She denies body aches, fevers or chills. She did have a sore throat on Sunday when this all started, and her ears are bothering her, with the left being the worst. She has not taken any type of medication, although she has been using cough drops, which gave her a little relief. She notes that the cough drops were no help with her cough last night. She states feeling a little discomfort in her chest. She has not been around anybody that is ill that she knows of. She states that she has had the flu in the past, but it has been awhile. She states that she does not like to take steroids. She did take an antibiotic approximately 1 month ago for an ear infection. She has no known medication allergies. She notes she has kidney disease so she cannot take ibuprofen. She reports that she has never had to take a cough medicine before, but she would like a nighttime one to help her sleep.       Objective   Vital Signs:  /76 (BP Location: Left arm, Patient Position: Sitting, Cuff Size: Adult)   Pulse 109   Temp 98.6 °F (37 °C) (Infrared)   Resp 20   Ht 167.6 cm (65.98\")   Wt 87.1 kg (192 lb)   SpO2 99%   BMI 31.00 kg/m²   Estimated body mass index is 31 kg/m² as calculated from the following:    " "Height as of this encounter: 167.6 cm (65.98\").    Weight as of this encounter: 87.1 kg (192 lb).          Physical Exam  Vitals and nursing note reviewed.   Constitutional:       General: She is not in acute distress.     Appearance: She is well-developed.   HENT:      Right Ear: Tympanic membrane and ear canal normal.      Left Ear: Tympanic membrane and ear canal normal.      Nose: Congestion present.      Right Sinus: Maxillary sinus tenderness present. No frontal sinus tenderness.      Left Sinus: Maxillary sinus tenderness present. No frontal sinus tenderness.      Mouth/Throat:      Mouth: Mucous membranes are moist.      Pharynx: Oropharynx is clear. Uvula midline. No uvula swelling.   Eyes:      Conjunctiva/sclera: Conjunctivae normal.   Neck:      Thyroid: No thyromegaly.      Trachea: No tracheal deviation.   Cardiovascular:      Rate and Rhythm: Normal rate and regular rhythm.      Heart sounds: Normal heart sounds.   Pulmonary:      Effort: Pulmonary effort is normal.      Breath sounds: Examination of the right-upper field reveals wheezing. Examination of the left-upper field reveals wheezing. Wheezing present.   Musculoskeletal:      Cervical back: Neck supple.   Lymphadenopathy:      Cervical: No cervical adenopathy.   Skin:     General: Skin is warm and dry.   Neurological:      Mental Status: She is alert.   Psychiatric:         Behavior: Behavior normal.        Result Review :           Point-of-care Covid-19 test negative in office.   Point-of-care flu test negative in office.           Assessment and Plan   Diagnoses and all orders for this visit:    1. Acute bronchitis, unspecified organism (Primary)  - I sent azithromycin. The patient can hold this for a few days and  if symptoms persist she cans start taking.  - I sent Phenergan DM and Tessalon Perles. I advised to use Phenergan DM for help with sleep at night as needed if she is having difficulty sleeping due to coughing.  - I recommend if " she is not improving in a few days to retest for COVID-19.    2. Acute non-recurrent maxillary sinusitis  - Point-of-care Covid-19 test negative in office.   -  Point-of-care flu test negative in office.   - I sent azithromycin if needed. Can start in a few days if no improvement .  - I recommend if she is not improving in a few days to retest for COVID-19.  - She can take Tylenol for her headache.     3. Cough  - I sent Phenergan DM to be taken at night.  - I sent Tessalon Perles that she can take during the day.  - I recommend if she is not improving in a few days to retest for COVID-19.  -     POCT SARS-CoV-2 Antigen TAMARA + Flu    Other orders  -     azithromycin (Zithromax Z-Mj) 250 MG tablet; Take 2 tablets by mouth on day 1, then 1 tablet daily on days 2-5  Dispense: 6 tablet; Refill: 0  -     promethazine-dextromethorphan (PROMETHAZINE-DM) 6.25-15 MG/5ML syrup; Take 5 mL by mouth At Night As Needed for Cough.  Dispense: 118 mL; Refill: 0  -     benzonatate (Tessalon Perles) 100 MG capsule; Take 1 capsule by mouth 3 (Three) Times a Day As Needed for Cough.  Dispense: 21 capsule; Refill: 0           Follow Up   Return in about 1 week (around 8/16/2022), or if symptoms worsen or fail to improve.  Patient was given instructions and counseling regarding her condition or for health maintenance advice. Please see specific information pulled into the AVS if appropriate.       Transcribed from ambient dictation for RUDY Araiza by TRIP GRIMM.  08/09/22   13:06 CDT    Patient verbalized consent to the visit recording.  I have personally performed the services described in this document as transcribed by the above individual, and it is both accurate and complete.  RUDY Araiza  8/9/2022  16:25 CDT

## 2023-02-06 ENCOUNTER — TRANSCRIBE ORDERS (OUTPATIENT)
Dept: ADMINISTRATIVE | Facility: HOSPITAL | Age: 73
End: 2023-02-06
Payer: COMMERCIAL

## 2023-02-06 DIAGNOSIS — Z12.31 VISIT FOR SCREENING MAMMOGRAM: Primary | ICD-10-CM

## 2023-03-17 ENCOUNTER — HOSPITAL ENCOUNTER (OUTPATIENT)
Dept: NUCLEAR MEDICINE | Age: 73
Discharge: HOME | End: 2023-03-19
Payer: COMMERCIAL

## 2023-03-17 DIAGNOSIS — M79.652 PAIN IN LEFT THIGH: ICD-10-CM

## 2023-03-17 DIAGNOSIS — Z96.642 PRESENCE OF LEFT ARTIFICIAL HIP JOINT: ICD-10-CM

## 2023-03-17 LAB
BASOPHILS # BLD: 0 K/UL (ref 0–0.2)
BASOPHILS NFR BLD: 0.3 % (ref 0–1)
CRP SERPL HS-MCNC: <0.3 MG/DL (ref 0–0.5)
EOSINOPHIL # BLD: 0.1 K/UL (ref 0–0.6)
EOSINOPHIL NFR BLD: 0.8 % (ref 0–5)
ERYTHROCYTE [DISTWIDTH] IN BLOOD BY AUTOMATED COUNT: 13.2 % (ref 11.5–14.5)
ERYTHROCYTE [SEDIMENTATION RATE] IN BLOOD BY WESTERGREN METHOD: 8 MM/HR (ref 0–25)
HCT VFR BLD AUTO: 42.5 % (ref 37–47)
HGB BLD-MCNC: 13.6 G/DL (ref 12–16)
IMM GRANULOCYTES # BLD: 0 K/UL
LYMPHOCYTES # BLD: 2.9 K/UL (ref 1.1–4.5)
LYMPHOCYTES NFR BLD: 31.9 % (ref 20–40)
MCH RBC QN AUTO: 29.8 PG (ref 27–31)
MCHC RBC AUTO-ENTMCNC: 32 G/DL (ref 33–37)
MCV RBC AUTO: 93 FL (ref 81–99)
MONOCYTES # BLD: 0.7 K/UL (ref 0–0.9)
MONOCYTES NFR BLD: 7.3 % (ref 0–10)
NEUTROPHILS # BLD: 5.4 K/UL (ref 1.5–7.5)
NEUTS SEG NFR BLD: 59.4 % (ref 50–65)
PLATELET # BLD AUTO: 275 K/UL (ref 130–400)
PMV BLD AUTO: 10.7 FL (ref 9.4–12.3)
RBC # BLD AUTO: 4.57 M/UL (ref 4.2–5.4)
WBC # BLD AUTO: 9.1 K/UL (ref 4.8–10.8)

## 2023-03-17 PROCEDURE — 78315 BONE IMAGING 3 PHASE: CPT

## 2023-03-17 PROCEDURE — 78315 BONE IMAGING 3 PHASE: CPT | Performed by: RADIOLOGY

## 2023-03-17 PROCEDURE — 3430000000 HC RX DIAGNOSTIC RADIOPHARMACEUTICAL: Performed by: ORTHOPAEDIC SURGERY

## 2023-03-17 PROCEDURE — A9503 TC99M MEDRONATE: HCPCS | Performed by: ORTHOPAEDIC SURGERY

## 2023-03-17 RX ORDER — TC 99M MEDRONATE 20 MG/10ML
20 INJECTION, POWDER, LYOPHILIZED, FOR SOLUTION INTRAVENOUS
Status: COMPLETED | OUTPATIENT
Start: 2023-03-17 | End: 2023-03-17

## 2023-03-17 RX ADMIN — TC 99M MEDRONATE 20 MILLICURIE: 20 INJECTION, POWDER, LYOPHILIZED, FOR SOLUTION INTRAVENOUS at 12:53

## 2023-06-06 ENCOUNTER — HOSPITAL ENCOUNTER (OUTPATIENT)
Dept: MAMMOGRAPHY | Facility: HOSPITAL | Age: 73
Discharge: HOME | End: 2023-06-06
Admitting: FAMILY MEDICINE
Payer: COMMERCIAL

## 2023-06-06 DIAGNOSIS — Z12.31 VISIT FOR SCREENING MAMMOGRAM: ICD-10-CM

## 2023-06-06 PROCEDURE — 77067 SCR MAMMO BI INCL CAD: CPT

## 2023-06-06 PROCEDURE — 77063 BREAST TOMOSYNTHESIS BI: CPT

## 2024-01-24 ENCOUNTER — HOSPITAL ENCOUNTER (OUTPATIENT)
Dept: PREADMISSION TESTING | Age: 74
Discharge: HOME | End: 2024-01-28
Payer: COMMERCIAL

## 2024-01-24 VITALS — HEIGHT: 66 IN | BODY MASS INDEX: 29.57 KG/M2 | WEIGHT: 184 LBS

## 2024-01-24 LAB
ALBUMIN SERPL-MCNC: 4.8 G/DL (ref 3.5–5.2)
ALP SERPL-CCNC: 97 U/L (ref 35–104)
ALT SERPL-CCNC: 17 U/L (ref 5–33)
ANION GAP SERPL CALCULATED.3IONS-SCNC: 16 MMOL/L (ref 7–19)
APTT PPP: 30.1 SEC (ref 26–36.2)
AST SERPL-CCNC: 20 U/L (ref 5–32)
BACTERIA URNS QL MICRO: NEGATIVE /HPF
BASOPHILS # BLD: 0.1 K/UL (ref 0–0.2)
BASOPHILS NFR BLD: 0.6 % (ref 0–1)
BILIRUB SERPL-MCNC: 0.5 MG/DL (ref 0.2–1.2)
BILIRUB UR QL STRIP: NEGATIVE
BUN SERPL-MCNC: 18 MG/DL (ref 8–23)
CALCIUM SERPL-MCNC: 10.1 MG/DL (ref 8.8–10.2)
CHLORIDE SERPL-SCNC: 103 MMOL/L (ref 98–111)
CLARITY UR: CLEAR
CO2 SERPL-SCNC: 25 MMOL/L (ref 22–29)
COLOR UR: YELLOW
CREAT SERPL-MCNC: 1.1 MG/DL (ref 0.5–0.9)
CRYSTALS URNS MICRO: ABNORMAL /HPF
EKG P AXIS: 27 DEGREES
EKG P-R INTERVAL: 130 MS
EKG Q-T INTERVAL: 406 MS
EKG QRS DURATION: 88 MS
EKG QTC CALCULATION (BAZETT): 430 MS
EKG T AXIS: 31 DEGREES
EOSINOPHIL # BLD: 0.3 K/UL (ref 0–0.6)
EOSINOPHIL NFR BLD: 2.9 % (ref 0–5)
EPI CELLS #/AREA URNS AUTO: 3 /HPF (ref 0–5)
ERYTHROCYTE [DISTWIDTH] IN BLOOD BY AUTOMATED COUNT: 12.8 % (ref 11.5–14.5)
GLUCOSE SERPL-MCNC: 87 MG/DL (ref 74–109)
GLUCOSE UR STRIP.AUTO-MCNC: NEGATIVE MG/DL
HCT VFR BLD AUTO: 43 % (ref 37–47)
HGB BLD-MCNC: 13.9 G/DL (ref 12–16)
HGB UR STRIP.AUTO-MCNC: NEGATIVE MG/L
HYALINE CASTS #/AREA URNS AUTO: 0 /HPF (ref 0–8)
IMM GRANULOCYTES # BLD: 0 K/UL
INR PPP: 1.07 (ref 0.88–1.18)
KETONES UR STRIP.AUTO-MCNC: NEGATIVE MG/DL
LEUKOCYTE ESTERASE UR QL STRIP.AUTO: ABNORMAL
LYMPHOCYTES # BLD: 2.9 K/UL (ref 1.1–4.5)
LYMPHOCYTES NFR BLD: 33.4 % (ref 20–40)
MCH RBC QN AUTO: 29.3 PG (ref 27–31)
MCHC RBC AUTO-ENTMCNC: 32.3 G/DL (ref 33–37)
MCV RBC AUTO: 90.5 FL (ref 81–99)
MONOCYTES # BLD: 0.9 K/UL (ref 0–0.9)
MONOCYTES NFR BLD: 11 % (ref 0–10)
MRSA DNA SPEC QL NAA+PROBE: DETECTED
NEUTROPHILS # BLD: 4.4 K/UL (ref 1.5–7.5)
NEUTS SEG NFR BLD: 51.7 % (ref 50–65)
NITRITE UR QL STRIP.AUTO: NEGATIVE
PH UR STRIP.AUTO: 7.5 [PH] (ref 5–8)
PLATELET # BLD AUTO: 291 K/UL (ref 130–400)
PMV BLD AUTO: 10.9 FL (ref 9.4–12.3)
POTASSIUM SERPL-SCNC: 4.4 MMOL/L (ref 3.5–5)
PROT SERPL-MCNC: 7.1 G/DL (ref 6.6–8.7)
PROT UR STRIP.AUTO-MCNC: NEGATIVE MG/DL
PROTHROMBIN TIME: 13.6 SEC (ref 12–14.6)
RBC # BLD AUTO: 4.75 M/UL (ref 4.2–5.4)
RBC #/AREA URNS AUTO: 2 /HPF (ref 0–4)
SODIUM SERPL-SCNC: 144 MMOL/L (ref 136–145)
SP GR UR STRIP.AUTO: 1.01 (ref 1–1.03)
UROBILINOGEN UR STRIP.AUTO-MCNC: 0.2 E.U./DL
WBC # BLD AUTO: 8.6 K/UL (ref 4.8–10.8)
WBC #/AREA URNS AUTO: 1 /HPF (ref 0–5)

## 2024-01-24 PROCEDURE — 81001 URINALYSIS AUTO W/SCOPE: CPT

## 2024-01-24 PROCEDURE — 87641 MR-STAPH DNA AMP PROBE: CPT

## 2024-01-24 PROCEDURE — 93005 ELECTROCARDIOGRAM TRACING: CPT | Performed by: ORTHOPAEDIC SURGERY

## 2024-01-24 PROCEDURE — 85610 PROTHROMBIN TIME: CPT

## 2024-01-24 PROCEDURE — 80053 COMPREHEN METABOLIC PANEL: CPT

## 2024-01-24 PROCEDURE — 85730 THROMBOPLASTIN TIME PARTIAL: CPT

## 2024-01-24 PROCEDURE — 85025 COMPLETE CBC W/AUTO DIFF WBC: CPT

## 2024-01-24 PROCEDURE — 93010 ELECTROCARDIOGRAM REPORT: CPT | Performed by: INTERNAL MEDICINE

## 2024-01-24 RX ORDER — PREGABALIN 75 MG/1
75 CAPSULE ORAL ONCE
OUTPATIENT
Start: 2024-01-24 | End: 2024-01-24

## 2024-01-24 RX ORDER — OXYCODONE HCL 10 MG/1
10 TABLET, FILM COATED, EXTENDED RELEASE ORAL ONCE
OUTPATIENT
Start: 2024-01-24 | End: 2024-01-24

## 2024-01-24 RX ORDER — ACETAMINOPHEN 500 MG
1000 TABLET ORAL ONCE
OUTPATIENT
Start: 2024-01-24 | End: 2024-01-24

## 2024-01-24 RX ORDER — CELECOXIB 200 MG/1
200 CAPSULE ORAL ONCE
OUTPATIENT
Start: 2024-01-24 | End: 2024-01-24

## 2024-01-24 RX ORDER — LOSARTAN POTASSIUM AND HYDROCHLOROTHIAZIDE 25; 100 MG/1; MG/1
1 TABLET ORAL NIGHTLY
COMMUNITY

## 2024-01-24 RX ORDER — DEXAMETHASONE SODIUM PHOSPHATE 10 MG/ML
10 INJECTION, SOLUTION INTRAMUSCULAR; INTRAVENOUS ONCE
OUTPATIENT
Start: 2024-01-24 | End: 2024-01-24

## 2024-01-24 NOTE — DISCHARGE INSTRUCTIONS
BACTROBAN OINTMENT for the NARES  start using on 2/7/24    A script for Bactroban ointment has been call to your pharmacy or was given to you in written form by your surgeon.  The guidelines for the ointment use are as follows:    1)  Start using the ointment 7 days before your surgery date    2)  Use the ointment two times a day - morning and night    3)  Place the ointment on a Q-tip and swirl up in your nose making sure you cover completely       the skin just inside of each nostril.  Use one end of the Q-tip for each nostril.     Chlorhexidine Gluconate 4% Solution  use night of the 12th, night of the 13th and morning of the 14th.    Patient should shower with this soap a minimum of 3 consecutive showers (2 nights before surgery, the night before surgery and the morning of surgery) washing from the neck down (avoiding contact with genitalia).  DO NOT WASH YOUR HAIR OR FACE WITH THIS SOAP.  When washing with this soap, apply enough to suds up the body thoroughly, turn the water away from your body and allow the soap suds to remain on the body for 2 full minutes, then rinse body completely.  After using this soap on the body, please do not apply powders or lotions to your body.  After the shower the night before surgery, please dry off with a new towel, sleep in new freshly laundered pj's, and change your bed linen before going to sleep.     OK to take all meds the night before procedure. Nothing the morning of surgery.    PREOPERATIVE GUIDELINES WHEN RECEIVING ANESTHESIA    Do not eat or drink anything after midnight, the night before your surgery. No gum or candy the morning of surgery.  This is extremely important for your safety.    Take a bath (or shower) the night before your surgery and you may brush your teeth the morning of your surgery.    You will be scheduled to arrive at the hospital 2 hours before your surgery, or follow your surgeon's instructions.    Dress comfortably.  Wear loose clothing that

## 2024-02-14 ENCOUNTER — HOSPITAL ENCOUNTER (INPATIENT)
Age: 74
LOS: 1 days | Discharge: HOME HEALTH | DRG: 470 | End: 2024-02-17
Attending: ORTHOPAEDIC SURGERY | Admitting: ORTHOPAEDIC SURGERY
Payer: MEDICARE

## 2024-02-14 ENCOUNTER — ANESTHESIA EVENT (OUTPATIENT)
Dept: OPERATING ROOM | Age: 74
End: 2024-02-14
Payer: MEDICARE

## 2024-02-14 ENCOUNTER — ANESTHESIA (OUTPATIENT)
Dept: OPERATING ROOM | Age: 74
End: 2024-02-14
Payer: MEDICARE

## 2024-02-14 ENCOUNTER — APPOINTMENT (OUTPATIENT)
Dept: GENERAL RADIOLOGY | Age: 74
DRG: 470 | End: 2024-02-14
Attending: ORTHOPAEDIC SURGERY
Payer: MEDICARE

## 2024-02-14 DIAGNOSIS — M17.11 PRIMARY OSTEOARTHRITIS OF RIGHT KNEE: Primary | ICD-10-CM

## 2024-02-14 PROBLEM — M17.10 ARTHRITIS OF KNEE: Status: ACTIVE | Noted: 2024-02-14

## 2024-02-14 PROCEDURE — 6360000002 HC RX W HCPCS: Performed by: ANESTHESIOLOGY

## 2024-02-14 PROCEDURE — 94760 N-INVAS EAR/PLS OXIMETRY 1: CPT

## 2024-02-14 PROCEDURE — 2580000003 HC RX 258: Performed by: ORTHOPAEDIC SURGERY

## 2024-02-14 PROCEDURE — G0378 HOSPITAL OBSERVATION PER HR: HCPCS

## 2024-02-14 PROCEDURE — 2709999900 HC NON-CHARGEABLE SUPPLY: Performed by: ORTHOPAEDIC SURGERY

## 2024-02-14 PROCEDURE — 2500000003 HC RX 250 WO HCPCS

## 2024-02-14 PROCEDURE — 64447 NJX AA&/STRD FEMORAL NRV IMG: CPT | Performed by: NURSE ANESTHETIST, CERTIFIED REGISTERED

## 2024-02-14 PROCEDURE — 2500000003 HC RX 250 WO HCPCS: Performed by: ANESTHESIOLOGY

## 2024-02-14 PROCEDURE — A4217 STERILE WATER/SALINE, 500 ML: HCPCS | Performed by: ORTHOPAEDIC SURGERY

## 2024-02-14 PROCEDURE — 6360000002 HC RX W HCPCS: Performed by: NURSE ANESTHETIST, CERTIFIED REGISTERED

## 2024-02-14 PROCEDURE — 2580000003 HC RX 258: Performed by: NURSE ANESTHETIST, CERTIFIED REGISTERED

## 2024-02-14 PROCEDURE — 6360000002 HC RX W HCPCS: Performed by: ORTHOPAEDIC SURGERY

## 2024-02-14 PROCEDURE — 2720000010 HC SURG SUPPLY STERILE: Performed by: ORTHOPAEDIC SURGERY

## 2024-02-14 PROCEDURE — 0SRC0JA REPLACEMENT OF RIGHT KNEE JOINT WITH SYNTHETIC SUBSTITUTE, UNCEMENTED, OPEN APPROACH: ICD-10-PCS | Performed by: ORTHOPAEDIC SURGERY

## 2024-02-14 PROCEDURE — 6370000000 HC RX 637 (ALT 250 FOR IP): Performed by: ORTHOPAEDIC SURGERY

## 2024-02-14 PROCEDURE — 3700000001 HC ADD 15 MINUTES (ANESTHESIA): Performed by: ORTHOPAEDIC SURGERY

## 2024-02-14 PROCEDURE — 94150 VITAL CAPACITY TEST: CPT

## 2024-02-14 PROCEDURE — C1776 JOINT DEVICE (IMPLANTABLE): HCPCS | Performed by: ORTHOPAEDIC SURGERY

## 2024-02-14 PROCEDURE — 6360000002 HC RX W HCPCS

## 2024-02-14 PROCEDURE — 8E0Y0CZ ROBOTIC ASSISTED PROCEDURE OF LOWER EXTREMITY, OPEN APPROACH: ICD-10-PCS | Performed by: ORTHOPAEDIC SURGERY

## 2024-02-14 PROCEDURE — 7100000000 HC PACU RECOVERY - FIRST 15 MIN: Performed by: ORTHOPAEDIC SURGERY

## 2024-02-14 PROCEDURE — 7100000001 HC PACU RECOVERY - ADDTL 15 MIN: Performed by: ORTHOPAEDIC SURGERY

## 2024-02-14 PROCEDURE — 2500000003 HC RX 250 WO HCPCS: Performed by: NURSE ANESTHETIST, CERTIFIED REGISTERED

## 2024-02-14 PROCEDURE — 3600000005 HC SURGERY LEVEL 5 BASE: Performed by: ORTHOPAEDIC SURGERY

## 2024-02-14 PROCEDURE — C9290 INJ, BUPIVACAINE LIPOSOME: HCPCS | Performed by: ORTHOPAEDIC SURGERY

## 2024-02-14 PROCEDURE — 2580000003 HC RX 258: Performed by: ANESTHESIOLOGY

## 2024-02-14 PROCEDURE — 3600000015 HC SURGERY LEVEL 5 ADDTL 15MIN: Performed by: ORTHOPAEDIC SURGERY

## 2024-02-14 PROCEDURE — 73560 X-RAY EXAM OF KNEE 1 OR 2: CPT

## 2024-02-14 PROCEDURE — 3700000000 HC ANESTHESIA ATTENDED CARE: Performed by: ORTHOPAEDIC SURGERY

## 2024-02-14 DEVICE — IMPLANTABLE DEVICE
Type: IMPLANTABLE DEVICE | Site: KNEE | Status: FUNCTIONAL
Brand: PERSONA® THE PERSONALIZED KNEE® OSSEOTI®

## 2024-02-14 DEVICE — IMPLANTABLE DEVICE
Type: IMPLANTABLE DEVICE | Site: KNEE | Status: FUNCTIONAL
Brand: PERSONA® TRABECULAR METAL®

## 2024-02-14 DEVICE — IMPLANTABLE DEVICE
Type: IMPLANTABLE DEVICE | Site: KNEE | Status: FUNCTIONAL
Brand: PERSONA® VIVACIT-E®

## 2024-02-14 RX ORDER — TRAMADOL HYDROCHLORIDE 50 MG/1
50 TABLET ORAL EVERY 6 HOURS
Status: DISCONTINUED | OUTPATIENT
Start: 2024-02-14 | End: 2024-02-17 | Stop reason: HOSPADM

## 2024-02-14 RX ORDER — DIPHENHYDRAMINE HCL 25 MG
25 TABLET ORAL EVERY 6 HOURS PRN
Status: DISCONTINUED | OUTPATIENT
Start: 2024-02-14 | End: 2024-02-17 | Stop reason: HOSPADM

## 2024-02-14 RX ORDER — HYDROMORPHONE HYDROCHLORIDE 1 MG/ML
1 INJECTION, SOLUTION INTRAMUSCULAR; INTRAVENOUS; SUBCUTANEOUS
Status: DISCONTINUED | OUTPATIENT
Start: 2024-02-14 | End: 2024-02-17 | Stop reason: HOSPADM

## 2024-02-14 RX ORDER — CYCLOBENZAPRINE HCL 10 MG
10 TABLET ORAL EVERY 12 HOURS PRN
Status: DISCONTINUED | OUTPATIENT
Start: 2024-02-14 | End: 2024-02-17 | Stop reason: HOSPADM

## 2024-02-14 RX ORDER — SODIUM CHLORIDE 9 MG/ML
INJECTION, SOLUTION INTRAVENOUS PRN
Status: DISCONTINUED | OUTPATIENT
Start: 2024-02-14 | End: 2024-02-14 | Stop reason: HOSPADM

## 2024-02-14 RX ORDER — ONDANSETRON 2 MG/ML
4 INJECTION INTRAMUSCULAR; INTRAVENOUS EVERY 6 HOURS PRN
Status: DISCONTINUED | OUTPATIENT
Start: 2024-02-14 | End: 2024-02-17 | Stop reason: HOSPADM

## 2024-02-14 RX ORDER — ROPIVACAINE HYDROCHLORIDE 5 MG/ML
INJECTION, SOLUTION EPIDURAL; INFILTRATION; PERINEURAL
Status: COMPLETED
Start: 2024-02-14 | End: 2024-02-14

## 2024-02-14 RX ORDER — SODIUM CHLORIDE, SODIUM LACTATE, POTASSIUM CHLORIDE, CALCIUM CHLORIDE 600; 310; 30; 20 MG/100ML; MG/100ML; MG/100ML; MG/100ML
INJECTION, SOLUTION INTRAVENOUS CONTINUOUS
Status: DISCONTINUED | OUTPATIENT
Start: 2024-02-14 | End: 2024-02-14 | Stop reason: HOSPADM

## 2024-02-14 RX ORDER — ONDANSETRON 4 MG/1
4 TABLET, ORALLY DISINTEGRATING ORAL EVERY 8 HOURS PRN
Status: DISCONTINUED | OUTPATIENT
Start: 2024-02-14 | End: 2024-02-17 | Stop reason: HOSPADM

## 2024-02-14 RX ORDER — CELECOXIB 200 MG/1
200 CAPSULE ORAL ONCE
Status: COMPLETED | OUTPATIENT
Start: 2024-02-14 | End: 2024-02-14

## 2024-02-14 RX ORDER — M-VIT,TX,IRON,MINS/CALC/FOLIC 27MG-0.4MG
1 TABLET ORAL NIGHTLY
Status: DISCONTINUED | OUTPATIENT
Start: 2024-02-14 | End: 2024-02-17 | Stop reason: HOSPADM

## 2024-02-14 RX ORDER — ESMOLOL HYDROCHLORIDE 10 MG/ML
INJECTION INTRAVENOUS PRN
Status: DISCONTINUED | OUTPATIENT
Start: 2024-02-14 | End: 2024-02-14 | Stop reason: SDUPTHER

## 2024-02-14 RX ORDER — HYDROMORPHONE HYDROCHLORIDE 1 MG/ML
0.25 INJECTION, SOLUTION INTRAMUSCULAR; INTRAVENOUS; SUBCUTANEOUS EVERY 5 MIN PRN
Status: DISCONTINUED | OUTPATIENT
Start: 2024-02-14 | End: 2024-02-14 | Stop reason: HOSPADM

## 2024-02-14 RX ORDER — CHOLECALCIFEROL (VITAMIN D3) 125 MCG
500 CAPSULE ORAL NIGHTLY
Status: DISCONTINUED | OUTPATIENT
Start: 2024-02-14 | End: 2024-02-17 | Stop reason: HOSPADM

## 2024-02-14 RX ORDER — SODIUM CHLORIDE 9 MG/ML
INJECTION, SOLUTION INTRAVENOUS PRN
Status: DISCONTINUED | OUTPATIENT
Start: 2024-02-14 | End: 2024-02-17 | Stop reason: HOSPADM

## 2024-02-14 RX ORDER — SODIUM CHLORIDE, SODIUM LACTATE, POTASSIUM CHLORIDE, CALCIUM CHLORIDE 600; 310; 30; 20 MG/100ML; MG/100ML; MG/100ML; MG/100ML
INJECTION, SOLUTION INTRAVENOUS CONTINUOUS PRN
Status: DISCONTINUED | OUTPATIENT
Start: 2024-02-14 | End: 2024-02-14 | Stop reason: SDUPTHER

## 2024-02-14 RX ORDER — ONDANSETRON 2 MG/ML
4 INJECTION INTRAMUSCULAR; INTRAVENOUS
Status: DISCONTINUED | OUTPATIENT
Start: 2024-02-14 | End: 2024-02-14 | Stop reason: HOSPADM

## 2024-02-14 RX ORDER — ONDANSETRON 2 MG/ML
INJECTION INTRAMUSCULAR; INTRAVENOUS PRN
Status: DISCONTINUED | OUTPATIENT
Start: 2024-02-14 | End: 2024-02-14 | Stop reason: SDUPTHER

## 2024-02-14 RX ORDER — PROPOFOL 10 MG/ML
INJECTION, EMULSION INTRAVENOUS PRN
Status: DISCONTINUED | OUTPATIENT
Start: 2024-02-14 | End: 2024-02-14 | Stop reason: SDUPTHER

## 2024-02-14 RX ORDER — SODIUM CHLORIDE 0.9 % (FLUSH) 0.9 %
5-40 SYRINGE (ML) INJECTION PRN
Status: DISCONTINUED | OUTPATIENT
Start: 2024-02-14 | End: 2024-02-14 | Stop reason: HOSPADM

## 2024-02-14 RX ORDER — LEVOTHYROXINE SODIUM 0.07 MG/1
150 TABLET ORAL NIGHTLY
Status: DISCONTINUED | OUTPATIENT
Start: 2024-02-14 | End: 2024-02-17 | Stop reason: HOSPADM

## 2024-02-14 RX ORDER — SODIUM CHLORIDE 0.9 % (FLUSH) 0.9 %
5-40 SYRINGE (ML) INJECTION EVERY 12 HOURS SCHEDULED
Status: DISCONTINUED | OUTPATIENT
Start: 2024-02-14 | End: 2024-02-14 | Stop reason: HOSPADM

## 2024-02-14 RX ORDER — ROPIVACAINE HYDROCHLORIDE 5 MG/ML
INJECTION, SOLUTION EPIDURAL; INFILTRATION; PERINEURAL
Status: COMPLETED | OUTPATIENT
Start: 2024-02-14 | End: 2024-02-14

## 2024-02-14 RX ORDER — SODIUM CHLORIDE 9 MG/ML
INJECTION, SOLUTION INTRAVENOUS CONTINUOUS
Status: DISCONTINUED | OUTPATIENT
Start: 2024-02-14 | End: 2024-02-17 | Stop reason: HOSPADM

## 2024-02-14 RX ORDER — DEXAMETHASONE SODIUM PHOSPHATE 10 MG/ML
10 INJECTION, SOLUTION INTRAMUSCULAR; INTRAVENOUS ONCE
Status: COMPLETED | OUTPATIENT
Start: 2024-02-14 | End: 2024-02-14

## 2024-02-14 RX ORDER — ACETAMINOPHEN 325 MG/1
650 TABLET ORAL EVERY 6 HOURS
Status: DISCONTINUED | OUTPATIENT
Start: 2024-02-14 | End: 2024-02-17 | Stop reason: HOSPADM

## 2024-02-14 RX ORDER — CETIRIZINE HYDROCHLORIDE 10 MG/1
10 TABLET ORAL NIGHTLY
Status: DISCONTINUED | OUTPATIENT
Start: 2024-02-14 | End: 2024-02-17 | Stop reason: HOSPADM

## 2024-02-14 RX ORDER — HYDROMORPHONE HYDROCHLORIDE 1 MG/ML
0.25 INJECTION, SOLUTION INTRAMUSCULAR; INTRAVENOUS; SUBCUTANEOUS
Status: DISCONTINUED | OUTPATIENT
Start: 2024-02-14 | End: 2024-02-17 | Stop reason: HOSPADM

## 2024-02-14 RX ORDER — ROSUVASTATIN CALCIUM 10 MG/1
5 TABLET, COATED ORAL NIGHTLY
Status: DISCONTINUED | OUTPATIENT
Start: 2024-02-14 | End: 2024-02-17 | Stop reason: HOSPADM

## 2024-02-14 RX ORDER — OXYCODONE HYDROCHLORIDE 10 MG/1
10 TABLET ORAL EVERY 4 HOURS PRN
Status: DISCONTINUED | OUTPATIENT
Start: 2024-02-14 | End: 2024-02-17 | Stop reason: HOSPADM

## 2024-02-14 RX ORDER — LIDOCAINE HYDROCHLORIDE 10 MG/ML
INJECTION, SOLUTION EPIDURAL; INFILTRATION; INTRACAUDAL; PERINEURAL PRN
Status: DISCONTINUED | OUTPATIENT
Start: 2024-02-14 | End: 2024-02-14 | Stop reason: SDUPTHER

## 2024-02-14 RX ORDER — HYDROMORPHONE HYDROCHLORIDE 1 MG/ML
0.5 INJECTION, SOLUTION INTRAMUSCULAR; INTRAVENOUS; SUBCUTANEOUS
Status: DISCONTINUED | OUTPATIENT
Start: 2024-02-14 | End: 2024-02-17 | Stop reason: HOSPADM

## 2024-02-14 RX ORDER — HYDROMORPHONE HYDROCHLORIDE 1 MG/ML
0.5 INJECTION, SOLUTION INTRAMUSCULAR; INTRAVENOUS; SUBCUTANEOUS EVERY 5 MIN PRN
Status: DISCONTINUED | OUTPATIENT
Start: 2024-02-14 | End: 2024-02-14 | Stop reason: HOSPADM

## 2024-02-14 RX ORDER — FENTANYL CITRATE 50 UG/ML
INJECTION, SOLUTION INTRAMUSCULAR; INTRAVENOUS PRN
Status: DISCONTINUED | OUTPATIENT
Start: 2024-02-14 | End: 2024-02-14 | Stop reason: SDUPTHER

## 2024-02-14 RX ORDER — NITROGLYCERIN 20 MG/100ML
INJECTION INTRAVENOUS PRN
Status: DISCONTINUED | OUTPATIENT
Start: 2024-02-14 | End: 2024-02-14 | Stop reason: SDUPTHER

## 2024-02-14 RX ORDER — FENTANYL CITRATE 50 UG/ML
100 INJECTION, SOLUTION INTRAMUSCULAR; INTRAVENOUS ONCE
Status: COMPLETED | OUTPATIENT
Start: 2024-02-14 | End: 2024-02-14

## 2024-02-14 RX ORDER — BISACODYL 5 MG/1
5 TABLET, DELAYED RELEASE ORAL DAILY
Status: DISCONTINUED | OUTPATIENT
Start: 2024-02-14 | End: 2024-02-17 | Stop reason: HOSPADM

## 2024-02-14 RX ORDER — OXYCODONE HCL 10 MG/1
10 TABLET, FILM COATED, EXTENDED RELEASE ORAL ONCE
Status: COMPLETED | OUTPATIENT
Start: 2024-02-14 | End: 2024-02-14

## 2024-02-14 RX ORDER — SODIUM CHLORIDE 0.9 % (FLUSH) 0.9 %
5-40 SYRINGE (ML) INJECTION PRN
Status: DISCONTINUED | OUTPATIENT
Start: 2024-02-14 | End: 2024-02-17 | Stop reason: HOSPADM

## 2024-02-14 RX ORDER — 0.9 % SODIUM CHLORIDE 0.9 %
500 INTRAVENOUS SOLUTION INTRAVENOUS PRN
Status: DISCONTINUED | OUTPATIENT
Start: 2024-02-14 | End: 2024-02-17 | Stop reason: HOSPADM

## 2024-02-14 RX ORDER — ROCURONIUM BROMIDE 10 MG/ML
INJECTION, SOLUTION INTRAVENOUS PRN
Status: DISCONTINUED | OUTPATIENT
Start: 2024-02-14 | End: 2024-02-14 | Stop reason: SDUPTHER

## 2024-02-14 RX ORDER — SODIUM CHLORIDE 0.9 % (FLUSH) 0.9 %
5-40 SYRINGE (ML) INJECTION EVERY 12 HOURS SCHEDULED
Status: DISCONTINUED | OUTPATIENT
Start: 2024-02-14 | End: 2024-02-17 | Stop reason: HOSPADM

## 2024-02-14 RX ORDER — OXYCODONE HYDROCHLORIDE 5 MG/1
5 TABLET ORAL EVERY 4 HOURS PRN
Status: DISCONTINUED | OUTPATIENT
Start: 2024-02-14 | End: 2024-02-17 | Stop reason: HOSPADM

## 2024-02-14 RX ORDER — DIPHENHYDRAMINE HYDROCHLORIDE 50 MG/ML
25 INJECTION INTRAMUSCULAR; INTRAVENOUS EVERY 6 HOURS PRN
Status: DISCONTINUED | OUTPATIENT
Start: 2024-02-14 | End: 2024-02-17 | Stop reason: HOSPADM

## 2024-02-14 RX ORDER — PREGABALIN 75 MG/1
75 CAPSULE ORAL ONCE
Status: COMPLETED | OUTPATIENT
Start: 2024-02-14 | End: 2024-02-14

## 2024-02-14 RX ORDER — TRANEXAMIC ACID 100 MG/ML
INJECTION, SOLUTION INTRAVENOUS PRN
Status: DISCONTINUED | OUTPATIENT
Start: 2024-02-14 | End: 2024-02-14 | Stop reason: SDUPTHER

## 2024-02-14 RX ORDER — ACETAMINOPHEN 500 MG
1000 TABLET ORAL ONCE
Status: COMPLETED | OUTPATIENT
Start: 2024-02-14 | End: 2024-02-14

## 2024-02-14 RX ADMIN — OXYCODONE 5 MG: 5 TABLET ORAL at 22:26

## 2024-02-14 RX ADMIN — BISACODYL 5 MG: 5 TABLET, COATED ORAL at 17:44

## 2024-02-14 RX ADMIN — ONDANSETRON 4 MG: 2 INJECTION INTRAMUSCULAR; INTRAVENOUS at 15:17

## 2024-02-14 RX ADMIN — CYANOCOBALAMIN TAB 500 MCG 500 MCG: 500 TAB at 20:25

## 2024-02-14 RX ADMIN — NITROGLYCERIN 50 MCG: 20 INJECTION INTRAVENOUS at 14:56

## 2024-02-14 RX ADMIN — TRAMADOL HYDROCHLORIDE 50 MG: 50 TABLET, COATED ORAL at 17:44

## 2024-02-14 RX ADMIN — OXYCODONE HYDROCHLORIDE 10 MG: 10 TABLET, FILM COATED, EXTENDED RELEASE ORAL at 11:14

## 2024-02-14 RX ADMIN — SUGAMMADEX 300 MG: 100 INJECTION, SOLUTION INTRAVENOUS at 15:26

## 2024-02-14 RX ADMIN — RIVAROXABAN 10 MG: 10 TABLET, FILM COATED ORAL at 22:26

## 2024-02-14 RX ADMIN — FENTANYL CITRATE 100 MCG: 0.05 INJECTION, SOLUTION INTRAMUSCULAR; INTRAVENOUS at 12:40

## 2024-02-14 RX ADMIN — CELECOXIB 200 MG: 200 CAPSULE ORAL at 11:14

## 2024-02-14 RX ADMIN — SODIUM CHLORIDE, POTASSIUM CHLORIDE, SODIUM LACTATE AND CALCIUM CHLORIDE: 600; 310; 30; 20 INJECTION, SOLUTION INTRAVENOUS at 11:21

## 2024-02-14 RX ADMIN — LEVOTHYROXINE SODIUM 150 MCG: 75 TABLET ORAL at 20:24

## 2024-02-14 RX ADMIN — CETIRIZINE HYDROCHLORIDE 10 MG: 10 TABLET, FILM COATED ORAL at 20:24

## 2024-02-14 RX ADMIN — DEXAMETHASONE SODIUM PHOSPHATE 10 MG: 10 INJECTION, SOLUTION INTRAMUSCULAR; INTRAVENOUS at 13:46

## 2024-02-14 RX ADMIN — ACETAMINOPHEN 650 MG: 325 TABLET ORAL at 20:24

## 2024-02-14 RX ADMIN — TRAMADOL HYDROCHLORIDE 50 MG: 50 TABLET, COATED ORAL at 22:26

## 2024-02-14 RX ADMIN — NITROGLYCERIN 50 MCG: 20 INJECTION INTRAVENOUS at 15:01

## 2024-02-14 RX ADMIN — SODIUM CHLORIDE, SODIUM LACTATE, POTASSIUM CHLORIDE, AND CALCIUM CHLORIDE: 600; 310; 30; 20 INJECTION, SOLUTION INTRAVENOUS at 13:36

## 2024-02-14 RX ADMIN — PREGABALIN 75 MG: 75 CAPSULE ORAL at 11:14

## 2024-02-14 RX ADMIN — TRANEXAMIC ACID 1000 MG: 1 INJECTION, SOLUTION INTRAVENOUS at 13:46

## 2024-02-14 RX ADMIN — PROPOFOL 150 MG: 10 INJECTION, EMULSION INTRAVENOUS at 13:41

## 2024-02-14 RX ADMIN — WATER 2000 MG: 1 INJECTION INTRAMUSCULAR; INTRAVENOUS; SUBCUTANEOUS at 22:26

## 2024-02-14 RX ADMIN — TRANEXAMIC ACID 1000 MG: 1 INJECTION, SOLUTION INTRAVENOUS at 15:22

## 2024-02-14 RX ADMIN — NITROGLYCERIN 50 MCG: 20 INJECTION INTRAVENOUS at 14:51

## 2024-02-14 RX ADMIN — ROCURONIUM BROMIDE 50 MG: 10 INJECTION, SOLUTION INTRAVENOUS at 13:42

## 2024-02-14 RX ADMIN — FENTANYL CITRATE 50 MCG: 0.05 INJECTION, SOLUTION INTRAMUSCULAR; INTRAVENOUS at 14:26

## 2024-02-14 RX ADMIN — Medication 1000 MG: at 11:22

## 2024-02-14 RX ADMIN — ESMOLOL HYDROCHLORIDE 15 MG: 10 INJECTION, SOLUTION INTRAVENOUS at 13:40

## 2024-02-14 RX ADMIN — ROPIVACAINE HYDROCHLORIDE 20 ML: 5 INJECTION, SOLUTION EPIDURAL; INFILTRATION; PERINEURAL at 12:41

## 2024-02-14 RX ADMIN — FENTANYL CITRATE 50 MCG: 0.05 INJECTION, SOLUTION INTRAMUSCULAR; INTRAVENOUS at 14:33

## 2024-02-14 RX ADMIN — SODIUM CHLORIDE: 9 INJECTION, SOLUTION INTRAVENOUS at 17:00

## 2024-02-14 RX ADMIN — ROSUVASTATIN 5 MG: 10 TABLET, FILM COATED ORAL at 20:25

## 2024-02-14 RX ADMIN — ROCURONIUM BROMIDE 20 MG: 10 INJECTION, SOLUTION INTRAVENOUS at 14:51

## 2024-02-14 RX ADMIN — SODIUM CHLORIDE, SODIUM LACTATE, POTASSIUM CHLORIDE, AND CALCIUM CHLORIDE: 600; 310; 30; 20 INJECTION, SOLUTION INTRAVENOUS at 15:21

## 2024-02-14 RX ADMIN — OXYCODONE HYDROCHLORIDE 10 MG: 10 TABLET ORAL at 17:46

## 2024-02-14 RX ADMIN — SODIUM CHLORIDE, PRESERVATIVE FREE 20 MG: 5 INJECTION INTRAVENOUS at 12:01

## 2024-02-14 RX ADMIN — LIDOCAINE HYDROCHLORIDE 40 MG: 10 INJECTION, SOLUTION EPIDURAL; INFILTRATION; INTRACAUDAL; PERINEURAL at 13:40

## 2024-02-14 RX ADMIN — CEFAZOLIN 2000 MG: 2 INJECTION, POWDER, FOR SOLUTION INTRAMUSCULAR; INTRAVENOUS at 13:40

## 2024-02-14 RX ADMIN — Medication 1 TABLET: at 20:25

## 2024-02-14 RX ADMIN — PROPOFOL 50 MG: 10 INJECTION, EMULSION INTRAVENOUS at 15:22

## 2024-02-14 RX ADMIN — ACETAMINOPHEN 1000 MG: 500 TABLET ORAL at 11:14

## 2024-02-14 RX ADMIN — NITROGLYCERIN 50 MCG: 20 INJECTION INTRAVENOUS at 15:04

## 2024-02-14 ASSESSMENT — PAIN DESCRIPTION - DESCRIPTORS
DESCRIPTORS: THROBBING
DESCRIPTORS: ACHING
DESCRIPTORS: ACHING
DESCRIPTORS: THROBBING

## 2024-02-14 ASSESSMENT — PAIN DESCRIPTION - LOCATION
LOCATION: LEG;KNEE
LOCATION: KNEE
LOCATION: KNEE;LEG
LOCATION: KNEE

## 2024-02-14 ASSESSMENT — PAIN DESCRIPTION - PAIN TYPE: TYPE: SURGICAL PAIN

## 2024-02-14 ASSESSMENT — PAIN DESCRIPTION - ORIENTATION
ORIENTATION: RIGHT;POSTERIOR
ORIENTATION: RIGHT
ORIENTATION: RIGHT;POSTERIOR
ORIENTATION: RIGHT

## 2024-02-14 ASSESSMENT — PAIN SCALES - GENERAL
PAINLEVEL_OUTOF10: 4
PAINLEVEL_OUTOF10: 4
PAINLEVEL_OUTOF10: 3
PAINLEVEL_OUTOF10: 4
PAINLEVEL_OUTOF10: 0

## 2024-02-14 ASSESSMENT — PAIN - FUNCTIONAL ASSESSMENT
PAIN_FUNCTIONAL_ASSESSMENT: NONE - DENIES PAIN
PAIN_FUNCTIONAL_ASSESSMENT: FACE, LEGS, ACTIVITY, CRY, AND CONSOLABILITY (FLACC)

## 2024-02-14 ASSESSMENT — PAIN DESCRIPTION - FREQUENCY: FREQUENCY: CONTINUOUS

## 2024-02-14 NOTE — PROGRESS NOTES
4 Eyes Skin Assessment     NAME:  Dilcia Fraire  YOB: 1950  MEDICAL RECORD NUMBER:  [id number]    The patient is being assessed for  Admission    I agree that at least one RN has performed a thorough Head to Toe Skin Assessment on the patient. ALL assessment sites listed below have been assessed.      Areas assessed by both nurses:    Head, Face, Ears, Shoulders, Back, Chest, Arms, Elbows, Hands, Sacrum. Buttock, Coccyx, Ischium, and Legs. Feet and Heels        Does the Patient have a Wound? No noted wound(s)       Sancho Prevention initiated by RN: No  Wound Care Orders initiated by RN: No    Pressure Injury (Stage 3,4, Unstageable, DTI, NWPT, and Complex wounds) if present, place Wound referral order by RN under : No    New Ostomies, if present place, Ostomy referral order under : No     Nurse 1 eSignature: Electronically signed by Primitivo Laureano RN on 2/14/24 at 4:58 PM CST    **SHARE this note so that the co-signing nurse can place an eSignature**    Nurse 2 eSignature: {Esignature:[id number]}

## 2024-02-14 NOTE — ANESTHESIA PRE PROCEDURE
complications:   Mallampati: IAirway:   TM distance: >3 FB   Neck ROM: full  Mouth opening: > = 3 FB   normal examDental:          normal exam breath sounds clear to auscultationPulmonary:       (-) asthma, recent URI, sleep apnea and not a current smoker          Patient did not smoke on day of surgery.                 Cardiovascular:  Exercise tolerance: good (>4 METS),   (+) hypertension:, hyperlipidemia    (-) pacemaker, past MI, CABG/stent, dysrhythmias and  angina    ECG reviewed  Rhythm: regular  Rate: normal                    Neuro/Psych:     (-) seizures, TIA and CVA            GI/Hepatic/Renal:        (-) GERD, liver disease and no renal disease       Endo/Other:    (+) hypothyroidism::., .    (-) diabetes mellitus, hyperthyroidism               Abdominal:             Vascular:     - DVT.      Other Findings:         Anesthesia Plan      regional and general     ASA 2     (Adductor canal block  Preop famotidine  Will not perform spinal due to sciatic pain)  Induction: intravenous.    MIPS: Postoperative opioids intended and Prophylactic antiemetics administered.  Anesthetic plan and risks discussed with patient and child/children.    Use of blood products discussed with patient and child/children whom consented to blood products.                 Gerson Arambula MD   2/14/2024

## 2024-02-14 NOTE — BRIEF OP NOTE
Brief Postoperative Note      Patient: Dilcia Fraire  YOB: 1950  MRN: [id number]    Date of Procedure: 2/14/2024    Pre-Op Diagnosis Codes:     * Primary osteoarthritis of right knee [M17.11]    Post-Op Diagnosis: Same       Procedure(s):  ROBOTIC RIGHT TOTAL KNEE ARTHROPLASTY    Surgeon(s):  Uriah Booker MD    Assistant:  First Assistant: Bill Coronel    Anesthesia: Choice    Estimated Blood Loss (mL): less than 100     Complications: None    Specimens:   * No specimens in log *    Implants:  Implant Name Type Inv. Item Serial No.  Lot No. LRB No. Used Action   PSN FEM CR POR CCR NRW SZ8 R - [id number]  PSN FEM CR POR CCR NRW SZ8 R  [employer]- [id number] Right 1 Implanted   BASEPLATE TIB KEELED 0 DEG E RT KNEE SPIKE OSSEOTI PERSONA - [id number]  BASEPLATE TIB KEELED 0 DEG E RT KNEE SPIKE OSSEOTI PERSONA  [employer]- [id number] Right 1 Implanted   PSN MC VE ASF R 10MM 8-11 EF - [id number]  PSN MC VE ASF R 10MM 8-11 EF  [employer]- [id number] Right 1 Implanted         Drains:   Urinary Catheter 02/14/24 2 Way;May (Active)       Findings: TT:24 minutes      Electronically signed by Uriah Booker MD on 2/14/2024 at 3:28 PM

## 2024-02-14 NOTE — ANESTHESIA PROCEDURE NOTES
Peripheral Block    Patient location during procedure: holding area  Reason for block: post-op pain management  Start time: 2/14/2024 12:41 PM  End time: 2/14/2024 12:43 PM  Staffing  Performed: anesthesiologist   Anesthesiologist: Gerson Arambula MD  Performed by: Gerson Arambula MD  Authorized by: Gerson Arambula MD    Preanesthetic Checklist  Completed: patient identified, IV checked, site marked, risks and benefits discussed, surgical/procedural consents, equipment checked, pre-op evaluation, timeout performed, anesthesia consent given, oxygen available, monitors applied/VS acknowledged, fire risk safety assessment completed and verbalized and blood product R/B/A discussed and consented  Peripheral Block   Patient position: supine  Prep: ChloraPrep  Provider prep: mask and sterile gloves  Patient monitoring: continuous pulse ox and frequent blood pressure checks  Block type: Femoral  Adductor canal  Laterality: right  Injection technique: single-shot  Guidance: ultrasound guided  Local infiltration: lidocaine  Local infiltration: lidocaine    Needle   Needle type: Quincke   Needle gauge: 20 G  Needle localization: ultrasound guidance  Needle length: 10 cm  Assessment   Injection assessment: negative aspiration for heme, no paresthesia on injection, local visualized surrounding nerve on ultrasound and no intravascular symptoms  Paresthesia pain: none  Slow fractionated injection: yes  Hemodynamics: stable  Real-time US image taken/store: yes  Outcomes: uncomplicated and patient tolerated procedure well    Medications Administered  ropivacaine (NAROPIN) injection 0.5% - Perineural   20 mL - 2/14/2024 12:41:00 PM

## 2024-02-14 NOTE — ANESTHESIA POSTPROCEDURE EVALUATION
Department of Anesthesiology  Postprocedure Note    Patient: Dilcia Fraire  MRN: [id number]  YOB: 1950  Date of evaluation: 2/14/2024    Procedure Summary       Date: 02/14/24 Room / Location: [address]    Anesthesia Start: 1336 Anesthesia Stop: 1543    Procedure: ROBOTIC RIGHT TOTAL KNEE ARTHROPLASTY (Right: Knee) Diagnosis:       Primary osteoarthritis of right knee      (Primary osteoarthritis of right knee [M17.11])    Surgeons: Uriah Booker MD Responsible Provider: Brien Cantor APRN - CRNA    Anesthesia Type: regional, general ASA Status: 2            Anesthesia Type: No value filed.    Lee Phase I: Lee Score: 10    Lee Phase II:      Anesthesia Post Evaluation    Patient location during evaluation: PACU  Patient participation: complete - patient participated  Level of consciousness: sleepy but conscious  Pain score: 0  Airway patency: patent  Nausea & Vomiting: no nausea and no vomiting  Cardiovascular status: blood pressure returned to baseline  Respiratory status: acceptable  Pain management: adequate        No notable events documented.

## 2024-02-14 NOTE — H&P
[facility] Pre-Operative History and Physical    Patient Name: Kimberlyn  : 1950    /63   Pulse 85   Temp 97 °F (36.1 °C) (Temporal)   Resp 16   SpO2 97%     Pre-Operative Diagnosis:  Knee arthritits    Proposed Surgical Procedure:   Knee replacement      Past Medical Hisotry:       Diagnosis Date    Arthritis     Chronic kidney disease     ckd 1    Colon polyps     Constipation     Diverticulosis     Hyperlipidemia     Hypertension     Hypothyroidism     Urinary urgency      Past Surgical History:       Procedure Laterality Date    CATARACT EXTRACTION EXTRACAPSULAR W/ INTRAOCULAR LENS IMPLANTATION Bilateral     COLONOSCOPY  2009    [place]    COLONOSCOPY  2014    [place]:  HP,  5y recall    COLONOSCOPY N/A 2021    Dr Lozano, Benign HP x 2, Benign lymphoid aggregate, Mod Diverticulosis, Int Hemorrhoids Grade 1 w sm perianal tags, no recall due to age    JOINT REPLACEMENT Left     2014 hip    SKIN BIOPSY Left     bcc hand    TUBAL LIGATION         Medications:   Prior to Admission medications    Medication Sig Start Date End Date Taking? Authorizing Provider   losartan-hydroCHLOROthiazide (HYZAAR) 100-25 MG per tablet Take 1 tablet by mouth at bedtime Indications: High Blood Pressure Disorder    ProviderSean MD   FIBER COMPLETE PO Take 15 mg by mouth at bedtime    Sean Cano MD   Multiple Vitamins-Minerals (THERAPEUTIC MULTIVITAMIN-MINERALS) tablet Take 1 tablet by mouth at bedtime    Sean Cano MD   HYDROcodone-acetaminophen (LORTAB)  MG per tablet Take 1 tablet by mouth every 6 hours as needed for Pain.    Sean Cano MD   levothyroxine (SYNTHROID) 100 MCG tablet Take 1.5 tablets by mouth at bedtime    Sean Cano MD   rosuvastatin (CRESTOR) 5 MG tablet Take 1 tablet by mouth at bedtime    Sean Cano MD   Cetirizine HCl (ZYRTEC ALLERGY) 10 MG CAPS Take 10 mg by mouth nightly    Provider

## 2024-02-15 LAB
ANION GAP SERPL CALCULATED.3IONS-SCNC: 12 MMOL/L (ref 7–19)
BUN SERPL-MCNC: 15 MG/DL (ref 8–23)
CALCIUM SERPL-MCNC: 8.4 MG/DL (ref 8.8–10.2)
CHLORIDE SERPL-SCNC: 106 MMOL/L (ref 98–111)
CO2 SERPL-SCNC: 22 MMOL/L (ref 22–29)
CREAT SERPL-MCNC: 1 MG/DL (ref 0.5–0.9)
FOLATE SERPL-MCNC: >20 NG/ML (ref 4.8–37.3)
GLUCOSE SERPL-MCNC: 99 MG/DL (ref 74–109)
HCT VFR BLD AUTO: 32.8 % (ref 37–47)
HGB BLD-MCNC: 10.5 G/DL (ref 12–16)
POTASSIUM SERPL-SCNC: 3.9 MMOL/L (ref 3.5–5)
SODIUM SERPL-SCNC: 140 MMOL/L (ref 136–145)

## 2024-02-15 PROCEDURE — 97116 GAIT TRAINING THERAPY: CPT

## 2024-02-15 PROCEDURE — 36415 COLL VENOUS BLD VENIPUNCTURE: CPT

## 2024-02-15 PROCEDURE — 6370000000 HC RX 637 (ALT 250 FOR IP): Performed by: ORTHOPAEDIC SURGERY

## 2024-02-15 PROCEDURE — 97535 SELF CARE MNGMENT TRAINING: CPT

## 2024-02-15 PROCEDURE — 6360000002 HC RX W HCPCS: Performed by: ORTHOPAEDIC SURGERY

## 2024-02-15 PROCEDURE — 2580000003 HC RX 258: Performed by: ORTHOPAEDIC SURGERY

## 2024-02-15 PROCEDURE — 94760 N-INVAS EAR/PLS OXIMETRY 1: CPT

## 2024-02-15 PROCEDURE — G0378 HOSPITAL OBSERVATION PER HR: HCPCS

## 2024-02-15 PROCEDURE — 97530 THERAPEUTIC ACTIVITIES: CPT

## 2024-02-15 PROCEDURE — 85014 HEMATOCRIT: CPT

## 2024-02-15 PROCEDURE — 82746 ASSAY OF FOLIC ACID SERUM: CPT

## 2024-02-15 PROCEDURE — 2700000000 HC OXYGEN THERAPY PER DAY

## 2024-02-15 PROCEDURE — 80048 BASIC METABOLIC PNL TOTAL CA: CPT

## 2024-02-15 PROCEDURE — 97165 OT EVAL LOW COMPLEX 30 MIN: CPT

## 2024-02-15 PROCEDURE — 85018 HEMOGLOBIN: CPT

## 2024-02-15 PROCEDURE — 97162 PT EVAL MOD COMPLEX 30 MIN: CPT

## 2024-02-15 PROCEDURE — 6370000000 HC RX 637 (ALT 250 FOR IP): Performed by: PHYSICIAN ASSISTANT

## 2024-02-15 RX ORDER — ONDANSETRON 4 MG/1
4 TABLET, FILM COATED ORAL EVERY 8 HOURS PRN
Qty: 30 TABLET | Refills: 0 | Status: SHIPPED | OUTPATIENT
Start: 2024-02-15

## 2024-02-15 RX ORDER — POLYETHYLENE GLYCOL 3350 17 G/17G
17 POWDER, FOR SOLUTION ORAL 2 TIMES DAILY
Status: DISCONTINUED | OUTPATIENT
Start: 2024-02-15 | End: 2024-02-17 | Stop reason: HOSPADM

## 2024-02-15 RX ORDER — OXYCODONE HYDROCHLORIDE 10 MG/1
10 TABLET ORAL ONCE
Status: COMPLETED | OUTPATIENT
Start: 2024-02-15 | End: 2024-02-15

## 2024-02-15 RX ORDER — ASPIRIN 81 MG/1
81 TABLET ORAL 2 TIMES DAILY
Qty: 42 TABLET | Refills: 0 | Status: SHIPPED | OUTPATIENT
Start: 2024-02-15

## 2024-02-15 RX ORDER — CLONIDINE HYDROCHLORIDE 0.1 MG/1
0.1 TABLET ORAL EVERY 4 HOURS PRN
Status: DISCONTINUED | OUTPATIENT
Start: 2024-02-15 | End: 2024-02-17 | Stop reason: HOSPADM

## 2024-02-15 RX ORDER — CYCLOBENZAPRINE HCL 10 MG
10 TABLET ORAL 3 TIMES DAILY PRN
Qty: 40 TABLET | Refills: 0 | Status: SHIPPED | OUTPATIENT
Start: 2024-02-15

## 2024-02-15 RX ORDER — OXYCODONE HYDROCHLORIDE 10 MG/1
10 TABLET ORAL SEE ADMIN INSTRUCTIONS
Qty: 90 TABLET | Refills: 0 | Status: SHIPPED | OUTPATIENT
Start: 2024-02-15 | End: 2024-03-14

## 2024-02-15 RX ORDER — RIVAROXABAN 10 MG/1
TABLET, FILM COATED ORAL
Qty: 21 TABLET | Refills: 0 | Status: SHIPPED | OUTPATIENT
Start: 2024-02-15 | End: 2024-02-15 | Stop reason: HOSPADM

## 2024-02-15 RX ADMIN — Medication 1000 MG: at 11:22

## 2024-02-15 RX ADMIN — OXYCODONE 5 MG: 5 TABLET ORAL at 21:25

## 2024-02-15 RX ADMIN — TRAMADOL HYDROCHLORIDE 50 MG: 50 TABLET, COATED ORAL at 06:20

## 2024-02-15 RX ADMIN — ACETAMINOPHEN 650 MG: 325 TABLET ORAL at 18:35

## 2024-02-15 RX ADMIN — ROSUVASTATIN 5 MG: 10 TABLET, FILM COATED ORAL at 21:24

## 2024-02-15 RX ADMIN — POLYETHYLENE GLYCOL 3350 17 G: 17 POWDER, FOR SOLUTION ORAL at 09:07

## 2024-02-15 RX ADMIN — BISACODYL 5 MG: 5 TABLET, COATED ORAL at 09:07

## 2024-02-15 RX ADMIN — CETIRIZINE HYDROCHLORIDE 10 MG: 10 TABLET, FILM COATED ORAL at 21:25

## 2024-02-15 RX ADMIN — TRAMADOL HYDROCHLORIDE 50 MG: 50 TABLET, COATED ORAL at 11:59

## 2024-02-15 RX ADMIN — Medication 1 TABLET: at 21:25

## 2024-02-15 RX ADMIN — CYCLOBENZAPRINE 10 MG: 10 TABLET, FILM COATED ORAL at 06:39

## 2024-02-15 RX ADMIN — POLYETHYLENE GLYCOL 3350 17 G: 17 POWDER, FOR SOLUTION ORAL at 21:28

## 2024-02-15 RX ADMIN — OXYCODONE HYDROCHLORIDE 10 MG: 10 TABLET ORAL at 06:46

## 2024-02-15 RX ADMIN — LEVOTHYROXINE SODIUM 150 MCG: 75 TABLET ORAL at 21:24

## 2024-02-15 RX ADMIN — ACETAMINOPHEN 650 MG: 325 TABLET ORAL at 11:59

## 2024-02-15 RX ADMIN — TRAMADOL HYDROCHLORIDE 50 MG: 50 TABLET, COATED ORAL at 16:48

## 2024-02-15 RX ADMIN — WATER 2000 MG: 1 INJECTION INTRAMUSCULAR; INTRAVENOUS; SUBCUTANEOUS at 13:25

## 2024-02-15 RX ADMIN — OXYCODONE 5 MG: 5 TABLET ORAL at 17:25

## 2024-02-15 RX ADMIN — Medication 1000 MG: at 01:02

## 2024-02-15 RX ADMIN — WATER 2000 MG: 1 INJECTION INTRAMUSCULAR; INTRAVENOUS; SUBCUTANEOUS at 06:20

## 2024-02-15 RX ADMIN — RIVAROXABAN 10 MG: 10 TABLET, FILM COATED ORAL at 17:25

## 2024-02-15 RX ADMIN — OXYCODONE HYDROCHLORIDE 10 MG: 10 TABLET ORAL at 06:20

## 2024-02-15 RX ADMIN — SODIUM CHLORIDE: 9 INJECTION, SOLUTION INTRAVENOUS at 11:21

## 2024-02-15 RX ADMIN — ACETAMINOPHEN 650 MG: 325 TABLET ORAL at 09:06

## 2024-02-15 RX ADMIN — CYANOCOBALAMIN TAB 500 MCG 500 MCG: 500 TAB at 21:00

## 2024-02-15 RX ADMIN — WATER 2000 MG: 1 INJECTION INTRAMUSCULAR; INTRAVENOUS; SUBCUTANEOUS at 21:25

## 2024-02-15 ASSESSMENT — PAIN SCALES - GENERAL
PAINLEVEL_OUTOF10: 6
PAINLEVEL_OUTOF10: 8
PAINLEVEL_OUTOF10: 4
PAINLEVEL_OUTOF10: 8
PAINLEVEL_OUTOF10: 6
PAINLEVEL_OUTOF10: 6
PAINLEVEL_OUTOF10: 5
PAINLEVEL_OUTOF10: 7
PAINLEVEL_OUTOF10: 4

## 2024-02-15 ASSESSMENT — PAIN DESCRIPTION - DESCRIPTORS
DESCRIPTORS: ACHING
DESCRIPTORS: ACHING
DESCRIPTORS: ACHING;DISCOMFORT
DESCRIPTORS: ACHING

## 2024-02-15 ASSESSMENT — PAIN DESCRIPTION - LOCATION
LOCATION: KNEE

## 2024-02-15 ASSESSMENT — PAIN DESCRIPTION - ORIENTATION
ORIENTATION: LEFT
ORIENTATION: RIGHT

## 2024-02-15 ASSESSMENT — PAIN DESCRIPTION - PAIN TYPE: TYPE: SURGICAL PAIN

## 2024-02-15 ASSESSMENT — PAIN DESCRIPTION - FREQUENCY: FREQUENCY: CONTINUOUS

## 2024-02-15 NOTE — PROGRESS NOTES
Pt arrived from PACU, R Knee with ace wrap, and Cryo cuff in place. CTMSP to R foot intact. Pt has no c/o pain at this time. No bleeding noted in ace wrap. Pt is alert and oriented. Daughter at bedside. Electronically signed by Adeline Herron RN on 2/14/2024 at 8:37 PM

## 2024-02-15 NOTE — CARE COORDINATION
Spoke with patient regarding MD orders for HH services.  Patient agreeable and has chosen [facility].  Referral Faxed.  [phone].  [phone].  Please notify - when patient discharges and fax DC Summary,  DC med list and any new HH orders.    The Patient and/or patient representative was provided with a choice of provider and agrees   with the discharge plan. [x] Yes [] No    Freedom of choice list was provided with basic dialogue that supports the patient's individualized plan of care/goals, treatment preferences and shares the quality data associated with the providers. [x] Yes [] No  Electronically signed by Pati Macias on 2/15/2024 at 1:20 PM

## 2024-02-15 NOTE — ANESTHESIA POSTPROCEDURE EVALUATION
Department of Anesthesiology  Postprocedure Note    Patient: Dilcia Fraire  MRN: [id number]  YOB: 1950  Date of evaluation: 2/15/2024    Procedure Summary       Date: 02/14/24 Room / Location: [address]    Anesthesia Start: 1336 Anesthesia Stop: 1543    Procedure: ROBOTIC RIGHT TOTAL KNEE ARTHROPLASTY (Right: Knee) Diagnosis:       Primary osteoarthritis of right knee      (Primary osteoarthritis of right knee [M17.11])    Surgeons: Uriah Booker MD Responsible Provider: Brien Cantor APRN - CRNA    Anesthesia Type: regional, general ASA Status: 2            Anesthesia Type: No value filed.    Lee Phase I: Lee Score: 10    Lee Phase II:      Anesthesia Post Evaluation    Patient location during evaluation: floor  Patient participation: complete - patient participated  Level of consciousness: awake and alert  Pain score: 4  Airway patency: patent  Nausea & Vomiting: no nausea and no vomiting  Cardiovascular status: blood pressure returned to baseline and hemodynamically stable  Respiratory status: room air and acceptable  Hydration status: euvolemic  Pain management: adequate    No notable events documented.

## 2024-02-15 NOTE — PROGRESS NOTES
Physical Therapy     02/15/24 1400   Subjective   Subjective attempted afternoon tx; pt not appropriate due to lethargy- lack of response/maintaining eyes open. nurse and PCA reported pt very drowsy post medicaiton for mm relaxer and pain. will continue to follow up and progress as able.     Electronically signed by Hardeep Mack PTA on 2/15/2024 at 2:56 PM

## 2024-02-15 NOTE — DISCHARGE INSTRUCTIONS
Dr Booker Total Knee Replacement Discharge Instructions     *Elevate legs at all times when not walking  * Use Ice Pack to affected extremity as needed for swelling and pain     * HOMEWORK          Be able to fully straighten leg by post-op appointment.  This is the most important thing to work on!!!          Use the ankle pillow or a towel roll under the ankle to work on straightening          You may work on bending the knee also    *Surgical Site Care:         The adhesive (glue strip) dressing can be removed in 3 weeks.         May shower on Friday and clean wound but do not scrub incision. Pat dry.          NO tub bathing or swimming.         Wash hands frequently during the day.     *Activity:      SAFETY FIRST ! WALK WITH A WALKER !!!                 Home Health therapy will come to the house three times a week:                    Get in and out of your bed                                                                                           Getting up and down out of the chair                                                                   Bathroom  / bathing activities                                                                                Do NOT walk for exercise ( it will increase pain and swelling )                            Walk several times a day but only for short distances             *Pain Medicines:          Keep a LOG of your medicines to track when pain med is due          Take prescribed medicine as needed for pain          Take Tylenol as your pain decreases          Take a stool softener of your choice while taking pain medications as they are very constipating ( examples:  colace  dulcolax  fiber   prune juice )      *To prevent blood clots:          Take prescribed blood thinner as directed.  (Eliquis 2.5 mg two times daily for 3 weeks)          After completing, you will take Aspirin 81 mg twice a day for the next three weeks           Do ankle pump exercises when

## 2024-02-15 NOTE — DISCHARGE INSTR - DIET
Good nutrition is important when healing from an illness, injury, or surgery.  Follow any nutrition recommendations given to you during your hospital stay.   If you were given an oral nutrition supplement while in the hospital, continue to take this supplement at home.  You can take it with meals, in-between meals, and/or before bedtime. These supplements can be purchased at most local grocery stores, pharmacies, and chain [employer]-stores.   If you have any questions about your diet or nutrition, call the hospital and ask for the dietitian.             Resume home diet

## 2024-02-15 NOTE — PROGRESS NOTES
Subjective:     Post-Operative Day: 1 Status Post right tka  Systemic or Specific Complaints:Pain Control  no nausea Pain 7    Objective:     Patient Vitals for the past 24 hrs:   BP Temp Temp src Pulse Resp SpO2 Height Weight   02/15/24 0647 -- -- -- -- -- 96 % -- --   02/15/24 0637 106/63 -- -- 72 -- -- -- --   02/15/24 0427 (!) 103/56 97.3 °F (36.3 °C) Temporal 75 16 94 % -- --   02/14/24 2327 (!) 106/57 98.1 °F (36.7 °C) Temporal 74 16 94 % -- --   02/14/24 2144 (!) 109/57 97.7 °F (36.5 °C) Temporal 85 16 95 % -- --   02/14/24 1939 (!) 119/53 97.3 °F (36.3 °C) Temporal 74 15 94 % -- --   02/14/24 1827 -- -- -- -- -- 97 % -- --   02/14/24 1752 (!) 103/54 (!) 96.3 °F (35.7 °C) Temporal 71 16 91 % -- --   02/14/24 1648 130/64 (!) 96.3 °F (35.7 °C) Temporal 75 16 100 % 1.676 m (5' 6\") 83.5 kg (184 lb)   02/14/24 1613 -- -- -- 62 10 94 % -- --   02/14/24 1607 -- -- -- 62 13 97 % -- --   02/14/24 1605 (!) 107/57 98 °F (36.7 °C) Temporal 63 13 100 % -- --   02/14/24 1602 -- -- -- 65 -- -- -- --   02/14/24 1550 (!) 111/58 -- -- 67 13 98 % -- --   02/14/24 1545 (!) 117/55 -- -- 75 15 92 % -- --   02/14/24 1542 121/61 97.9 °F (36.6 °C) Temporal 76 16 94 % -- --   02/14/24 1540 121/61 -- -- 81 10 97 % -- --   02/14/24 1539 121/61 97.9 °F (36.6 °C) Temporal 79 14 98 % -- --   02/14/24 1335 -- -- -- 61 24 99 % -- --   02/14/24 1330 (!) 94/51 -- -- 59 12 98 % -- --   02/14/24 1325 -- -- -- 60 13 99 % -- --   02/14/24 1320 -- -- -- 60 14 99 % -- --   02/14/24 1315 (!) 102/54 -- -- 60 15 99 % -- --   02/14/24 1310 -- -- -- 61 10 99 % -- --   02/14/24 1305 -- -- -- 61 14 98 % -- --   02/14/24 1300 (!) 102/58 -- -- 60 12 99 % -- --   02/14/24 1255 -- -- -- 60 11 100 % -- --   02/14/24 1250 -- -- -- 59 18 99 % -- --   02/14/24 1245 117/66 -- -- 64 18 96 % -- --   02/14/24 1230 134/68 -- -- 69 20 95 % -- --   02/14/24 1052 132/63 97 °F (36.1 °C) Temporal 85 16 97 % -- --       General: alert, appears stated age, and cooperative

## 2024-02-15 NOTE — PROGRESS NOTES
Physical Therapy  Facility/Department: [facility] SURG SERVICES  Physical Therapy Initial Assessment    Name: Dilcia Fraire  : 1950  MRN: [id number]  Date of Service: 2/15/2024    Discharge Recommendations:  Continue to assess pending progress, 24 hour supervision or assist, Patient would benefit from continued therapy after discharge          Patient Diagnosis(es): The encounter diagnosis was Primary osteoarthritis of right knee.  Past Medical History:  has a past medical history of Arthritis, Chronic kidney disease, Colon polyps, Constipation, Diverticulosis, Hyperlipidemia, Hypertension, Hypothyroidism, and Urinary urgency.  Past Surgical History:  has a past surgical history that includes Colonoscopy (2009); Tubal ligation; Colonoscopy (2014); joint replacement (Left); Colonoscopy (N/A, 2021); Cataract extraction, extracapsular w/ intraocular lens implant (Bilateral); skin biopsy (Left); and Total knee arthroplasty (Right, 2024).    Assessment   Body Structures, Functions, Activity Limitations Requiring Skilled Therapeutic Intervention: Decreased functional mobility ;Decreased ADL status;Decreased ROM;Decreased safe awareness;Decreased balance;Increased pain;Decreased posture  Assessment: Pt. will benefit from cont. PT to decrease impairments. Pt. a fall risk and should not attempt mobility on her own at this time. Anticipate pt may need additional therapy prior to d/c home. Pt. needs cont. gait and transfer training. Her R knee was buckling with mobility.  Treatment Diagnosis: impaired gait and mobility  Therapy Prognosis: Good  Decision Making: Medium Complexity  Barriers to Learning: pt feeling \"loopy\"  Requires PT Follow-Up: Yes  Activity Tolerance  Activity Tolerance: Patient limited by pain;Patient limited by fatigue     Plan   Physical Therapy Plan  General Plan: 6-7 times per week  Therapy Duration: 2 Weeks  Current Treatment Recommendations: Strengthening, Balance training, ROM,  Yes  Vision/Hearing  Vision  Vision: Impaired  Vision Exceptions: Wears glasses for reading  Hearing  Hearing: Within functional limits    Cognition   Orientation  Overall Orientation Status: Within Functional Limits  Cognition  Overall Cognitive Status: Exceptions  Arousal/Alertness: Appropriate responses to stimuli  Following Commands: Follows one step commands with increased time;Follows one step commands with repetition  Memory: Decreased recall of precautions;Decreased recall of recent events  Safety Judgement: Decreased awareness of need for assistance;Decreased awareness of need for safety  Problem Solving: Assistance required to generate solutions;Assistance required to implement solutions;Decreased awareness of errors  Insights: Decreased awareness of deficits  Initiation: Requires cues for some  Sequencing: Requires cues for some     Objective   Pulse: 81  Heart Rate Source: Monitor  BP: (!) 108/51  BP Location: Left upper arm  Patient Position: Supine  MAP (Calculated): 70  Respirations: 20  SpO2: 90 %  O2 Device: None (Room air)  Temp: 97.3 °F (36.3 °C)     Observation/Palpation  Posture: Fair  Observation: IV, purewick (removed and assisted pt with ian goodman)  Gross Assessment  Sensation: Impaired (pt comments her R knee feels numb)     AROM RLE (degrees)  RLE AROM: Exceptions  RLE General AROM: ankle WFLs, knee 5-70, hip 0-90  AROM LLE (degrees)  LLE AROM : WFL  Strength RLE  Strength RLE: Exception  Comment: 2+/5 knee, hip 3-/5, anlke 3+/5  Strength LLE  Strength LLE: WFL  Comment: 4+/5           Bed mobility  Supine to Sit: Minimal assistance  Sit to Supine: Unable to assess  Scooting: Minimal assistance  Bed Mobility Comments: pt sat on EOB x 5 mins SBA  Transfers  Sit to Stand: Minimal Assistance  Stand to Sit: Minimal Assistance  Comment: pt needing A ian brief in bathroom. Pt. did not stand at sink to wash and dry hands due to R knee buckling with fatigue. Pt. given washcloth to clean

## 2024-02-15 NOTE — OP NOTE
[address] [place] [address]                                OPERATIVE REPORT    PATIENT NAME: BOZENA STEWARD                     :        1950  MED REC NO:   [id number]                              ROOM:       [facility]  ACCOUNT NO:   [id number]                           ADMIT DATE: 2024  PROVIDER:     Uriah Booker MD    DATE OF PROCEDURE:  2024    PREOPERATIVE DIAGNOSIS:  Primary osteoarthritis, right knee.    POSTOPERATIVE DIAGNOSIS:  Primary osteoarthritis, right knee.    OPERATION PERFORMED:  Robotic-assisted right total knee replacement.    SURGEON:  Uriah Booker MD    FIRST ASSISTANT:  Homer.    ANESTHESIA:  General with adductor canal block.    EBL:  80 mL.    FLUIDS:  1000 mL of crystalloid.    URINE OUTPUT:  35 mL.    TOURNIQUET TIME:  24 minutes.    COMPONENTS USED:  Dk Persona knee system, femur size 8 narrow CR TM  press-fit femur, tibia size E OsseoTi press-fit tibia, polyethylene size  10 MC polyethylene.    INDICATIONS:  This is a 73-year-old lady with severe arthritis of the  right knee, failing conservative care.  Because of this, she elected for  the above procedure.    OPERATIVE PROCEDURE:  After informed consent, she was given 2 gm of  Ancef, 1 gm of tranexamic acid, underwent adductor canal block and  general anesthetic.  Tourniquet was placed around the right upper thigh.  Left leg was placed in SCD.  Olvera catheter was inserted.  Again, she  was given 1 gm of vancomycin due to the fact that she had positive MRSA  nasal swab.  Right leg was prepped and draped in the usual sterile  fashion.  Leg was Esmarched.  Tourniquet was inflated to 250 mmHg.  A  midline incision was made.  Parapatellar approach was made.  Prepatellar  fat pad and synovium were excised.  Superomedial and proximal tibial  array were placed.  We then registered the patient's anatomy with the  robot.    Robotic analysis revealed 0  be on our typical postop protocol.  2.  She will be on 48 hours of vancomycin and 2 doses of Ancef.  3.  She will be on Xarelto 10 mg a day for 21 days followed by baby  aspirin 81 mg twice a day for another 3 weeks.    Please note, prior to surgery, range of motion was 24 to 126 degrees  prior to surgery.        NIA JOLLY MD    D: 02/14/2024 16:33:06      T: 02/15/2024 1:01:55     [email or web address]  Job#: [id number]     Doc#: [id number]    CC:

## 2024-02-15 NOTE — CARE COORDINATION
TATIANA Michelle met with Pt at bedside to discuss the Pt MILES  letter and to answer any questions/concerns at that time. The Pt did not have any questions/concerns at that time. The Pt signed the MOON letter and it was filed in the Pt. Soft chart. This writer provided the Pt with the MOON letter for their personal records.          02/15/24 1303   IMM Letter   Observation Status Letter date given: 02/15/24   Observation Status Letter time given: 1048   Observation Status Letter given to Patient/Family/Significant other/Guardian/POA/by: Alysa Harrison

## 2024-02-15 NOTE — CARE COORDINATION
TATIANA Michelle met with Pt at bedside to complete the Pt MILES letter and to answer any questions concerns they may have at that time. The Pt did not have any questions/concerns at that time. The Pt signed the MOON letter and it was filed in the Pt soft chart. This writer provided the Pt with the MOON letter for their personal files.          02/15/24 1356   IMM Letter   Observation Status Letter date given: 02/15/24   Observation Status Letter time given: 1048   Observation Status Letter given to Patient/Family/Significant other/Guardian/POA/by: Alysa Harrison

## 2024-02-15 NOTE — CONSULTS
Referring Provider: Dr. Booker  Reason for Consultation: Medical management    Patient Care Team:  Carrington Eden MD as PCP - General (Family Medicine)    Chief complaint right knee pain    Subjective .     History of present illness:  The patient presents to the orthopedic service for TKA. They have failed outpatient conservative treatment of NSAIDS, muscle relaxer, physical therapy and opioid pain meds. The pain is affecting their activities of daily living and they have chosen to undergo surgical correction.  We have been asked to provide medical consultation by the attending physician since their primary care provider does not attend here at [facility].  Our role in their healthcare during the perioperative phase was discussed with the patient.  All questions were encouraged and answered to the best of our ability. The postoperative pain is as expected. There are no other participating or relieving factors noted.       REVIEW OF SYSTEMS:    CONSTITUTIONAL:  Negative for anorexia, chills, fevers, night sweats and weight loss  EYES:  negative for eye dryness, icterus and redness  HEENT:   negative for dental problems, epistaxis, facial trauma and thrush  RESPIRATORY:  negative for chest tightness, cough, dyspnea on exertion, pneumonia and sputum  CARDIOVASCULAR: negative for chest pain, dyspnea, exertional chest pressure/discomfort, irregular heart beat, palpitations, paroxysmal nocturnal dyspnea and syncope  GASTROINTESTINAL:  negative for abdominal pain, hematemesis, jaundice, melena and rectal bleeding  MUSCULOSKELETAL:  negative for muscle weakness, myalgias and neck pain, chronic joint pain noted  NEUROLOGICAL:   negative for dizziness, headaches, seizures, speech problems, tremors and vertigo  INTEGUMENT: negative for pruritus, rash, skin color change and skin lesion(s)   A Full 14 point review of systems is negative outside those listed above and in the HPI      History    Past Medical History:

## 2024-02-15 NOTE — PROGRESS NOTES
Occupational Therapy Initial Assessment  Date: 2/15/2024   Patient Name: Dilcia Fraire  MRN: [id number]     : 1950    Date of Service: 2/15/2024    Discharge Recommendations:  Patient would benefit from continued therapy after discharge       Assessment   Performance deficits / Impairments: Decreased functional mobility ;Decreased ADL status;Decreased strength;Decreased balance;Decreased endurance  Assessment: Evaluation completed and tx initiated.  The patient would benefit from further skilled therapy to upgrade safety and functional independence.  Treatment Diagnosis: Primary osteoarthritis of R knee  REQUIRES OT FOLLOW-UP: Yes  Activity Tolerance  Activity Tolerance: Patient limited by fatigue              Patient Diagnosis(es): The encounter diagnosis was Primary osteoarthritis of right knee.    Past Medical History:   Past Medical History:   Diagnosis Date    Arthritis     Chronic kidney disease     ckd 1    Colon polyps     Constipation     Diverticulosis     Hyperlipidemia     Hypertension     Hypothyroidism     Urinary urgency         Past Surgical History:   Past Surgical History:   Procedure Laterality Date    CATARACT EXTRACTION EXTRACAPSULAR W/ INTRAOCULAR LENS IMPLANTATION Bilateral     COLONOSCOPY  2009    [place]    COLONOSCOPY  2014    [place]:  HP,  5y recall    COLONOSCOPY N/A 2021    Dr Lozano, Benign HP x 2, Benign lymphoid aggregate, Mod Diverticulosis, Int Hemorrhoids Grade 1 w sm perianal tags, no recall due to age    JOINT REPLACEMENT Left     2014 hip    SKIN BIOPSY Left     [facility] hand    TOTAL KNEE ARTHROPLASTY Right 2024    ROBOTIC RIGHT TOTAL KNEE ARTHROPLASTY performed by Uriah Booker MD at [facility] OR    TUBAL LIGATION         Treatment Diagnosis: Primary osteoarthritis of R knee      Restrictions  Restrictions/Precautions  Restrictions/Precautions: Fall Risk, Weight Bearing  Lower Extremity Weight Bearing Restrictions  Right Lower Extremity

## 2024-02-16 LAB
ANION GAP SERPL CALCULATED.3IONS-SCNC: 10 MMOL/L (ref 7–19)
BUN SERPL-MCNC: 13 MG/DL (ref 8–23)
CALCIUM SERPL-MCNC: 8.2 MG/DL (ref 8.8–10.2)
CHLORIDE SERPL-SCNC: 104 MMOL/L (ref 98–111)
CO2 SERPL-SCNC: 22 MMOL/L (ref 22–29)
CREAT SERPL-MCNC: 0.9 MG/DL (ref 0.5–0.9)
GLUCOSE SERPL-MCNC: 114 MG/DL (ref 74–109)
HCT VFR BLD AUTO: 31.5 % (ref 37–47)
HGB BLD-MCNC: 10.2 G/DL (ref 12–16)
IRON SATN MFR SERPL: 7 % (ref 14–50)
IRON SERPL-MCNC: 14 UG/DL (ref 37–145)
POTASSIUM SERPL-SCNC: 3.7 MMOL/L (ref 3.5–5)
SODIUM SERPL-SCNC: 136 MMOL/L (ref 136–145)
TIBC SERPL-MCNC: 189 UG/DL (ref 250–400)
VIT B12 SERPL-MCNC: 1813 PG/ML (ref 211–946)

## 2024-02-16 PROCEDURE — 83550 IRON BINDING TEST: CPT

## 2024-02-16 PROCEDURE — 6370000000 HC RX 637 (ALT 250 FOR IP): Performed by: PHYSICIAN ASSISTANT

## 2024-02-16 PROCEDURE — 97530 THERAPEUTIC ACTIVITIES: CPT

## 2024-02-16 PROCEDURE — 85018 HEMOGLOBIN: CPT

## 2024-02-16 PROCEDURE — 97116 GAIT TRAINING THERAPY: CPT

## 2024-02-16 PROCEDURE — 94760 N-INVAS EAR/PLS OXIMETRY 1: CPT

## 2024-02-16 PROCEDURE — G0378 HOSPITAL OBSERVATION PER HR: HCPCS

## 2024-02-16 PROCEDURE — 82607 VITAMIN B-12: CPT

## 2024-02-16 PROCEDURE — 80048 BASIC METABOLIC PNL TOTAL CA: CPT

## 2024-02-16 PROCEDURE — 6360000002 HC RX W HCPCS: Performed by: ORTHOPAEDIC SURGERY

## 2024-02-16 PROCEDURE — 6370000000 HC RX 637 (ALT 250 FOR IP): Performed by: ORTHOPAEDIC SURGERY

## 2024-02-16 PROCEDURE — 51798 US URINE CAPACITY MEASURE: CPT

## 2024-02-16 PROCEDURE — 85014 HEMATOCRIT: CPT

## 2024-02-16 PROCEDURE — 2580000003 HC RX 258: Performed by: ORTHOPAEDIC SURGERY

## 2024-02-16 PROCEDURE — 83540 ASSAY OF IRON: CPT

## 2024-02-16 PROCEDURE — 36415 COLL VENOUS BLD VENIPUNCTURE: CPT

## 2024-02-16 RX ORDER — FERROUS SULFATE 325(65) MG
325 TABLET ORAL 2 TIMES DAILY WITH MEALS
Status: DISCONTINUED | OUTPATIENT
Start: 2024-02-16 | End: 2024-02-17 | Stop reason: HOSPADM

## 2024-02-16 RX ADMIN — WATER 2000 MG: 1 INJECTION INTRAMUSCULAR; INTRAVENOUS; SUBCUTANEOUS at 05:21

## 2024-02-16 RX ADMIN — Medication 1000 MG: at 00:24

## 2024-02-16 RX ADMIN — LEVOTHYROXINE SODIUM 150 MCG: 75 TABLET ORAL at 22:15

## 2024-02-16 RX ADMIN — OXYCODONE HYDROCHLORIDE 10 MG: 10 TABLET ORAL at 22:19

## 2024-02-16 RX ADMIN — Medication 1 TABLET: at 22:16

## 2024-02-16 RX ADMIN — TRAMADOL HYDROCHLORIDE 50 MG: 50 TABLET, COATED ORAL at 05:21

## 2024-02-16 RX ADMIN — Medication 1000 MG: at 13:34

## 2024-02-16 RX ADMIN — RIVAROXABAN 10 MG: 10 TABLET, FILM COATED ORAL at 18:35

## 2024-02-16 RX ADMIN — SODIUM CHLORIDE, PRESERVATIVE FREE 10 ML: 5 INJECTION INTRAVENOUS at 09:58

## 2024-02-16 RX ADMIN — ROSUVASTATIN 5 MG: 10 TABLET, FILM COATED ORAL at 22:16

## 2024-02-16 RX ADMIN — OXYCODONE 5 MG: 5 TABLET ORAL at 09:56

## 2024-02-16 RX ADMIN — ACETAMINOPHEN 650 MG: 325 TABLET ORAL at 18:35

## 2024-02-16 RX ADMIN — CYCLOBENZAPRINE 10 MG: 10 TABLET, FILM COATED ORAL at 22:19

## 2024-02-16 RX ADMIN — TRAMADOL HYDROCHLORIDE 50 MG: 50 TABLET, COATED ORAL at 00:21

## 2024-02-16 RX ADMIN — OXYCODONE 5 MG: 5 TABLET ORAL at 18:35

## 2024-02-16 RX ADMIN — ACETAMINOPHEN 650 MG: 325 TABLET ORAL at 09:55

## 2024-02-16 RX ADMIN — TRAMADOL HYDROCHLORIDE 50 MG: 50 TABLET, COATED ORAL at 22:16

## 2024-02-16 RX ADMIN — OXYCODONE 5 MG: 5 TABLET ORAL at 06:00

## 2024-02-16 RX ADMIN — CETIRIZINE HYDROCHLORIDE 10 MG: 10 TABLET, FILM COATED ORAL at 22:16

## 2024-02-16 RX ADMIN — ACETAMINOPHEN 650 MG: 325 TABLET ORAL at 00:21

## 2024-02-16 RX ADMIN — OXYCODONE 5 MG: 5 TABLET ORAL at 14:20

## 2024-02-16 RX ADMIN — BISACODYL 5 MG: 5 TABLET, COATED ORAL at 09:55

## 2024-02-16 RX ADMIN — OXYCODONE 5 MG: 5 TABLET ORAL at 01:40

## 2024-02-16 RX ADMIN — TRAMADOL HYDROCHLORIDE 50 MG: 50 TABLET, COATED ORAL at 09:55

## 2024-02-16 RX ADMIN — FERROUS SULFATE TAB 325 MG (65 MG ELEMENTAL FE) 325 MG: 325 (65 FE) TAB at 18:35

## 2024-02-16 RX ADMIN — ACETAMINOPHEN 650 MG: 325 TABLET ORAL at 23:33

## 2024-02-16 RX ADMIN — CYANOCOBALAMIN TAB 500 MCG 500 MCG: 500 TAB at 22:17

## 2024-02-16 RX ADMIN — ACETAMINOPHEN 650 MG: 325 TABLET ORAL at 13:25

## 2024-02-16 RX ADMIN — TRAMADOL HYDROCHLORIDE 50 MG: 50 TABLET, COATED ORAL at 18:36

## 2024-02-16 RX ADMIN — FERROUS SULFATE TAB 325 MG (65 MG ELEMENTAL FE) 325 MG: 325 (65 FE) TAB at 09:55

## 2024-02-16 RX ADMIN — WATER 2000 MG: 1 INJECTION INTRAMUSCULAR; INTRAVENOUS; SUBCUTANEOUS at 13:25

## 2024-02-16 ASSESSMENT — PAIN DESCRIPTION - ORIENTATION
ORIENTATION: RIGHT

## 2024-02-16 ASSESSMENT — PAIN SCALES - GENERAL
PAINLEVEL_OUTOF10: 8
PAINLEVEL_OUTOF10: 6
PAINLEVEL_OUTOF10: 2
PAINLEVEL_OUTOF10: 0
PAINLEVEL_OUTOF10: 4
PAINLEVEL_OUTOF10: 2
PAINLEVEL_OUTOF10: 8
PAINLEVEL_OUTOF10: 3

## 2024-02-16 ASSESSMENT — PAIN DESCRIPTION - LOCATION
LOCATION: KNEE

## 2024-02-16 ASSESSMENT — PAIN DESCRIPTION - DESCRIPTORS
DESCRIPTORS: ACHING

## 2024-02-16 ASSESSMENT — PAIN - FUNCTIONAL ASSESSMENT
PAIN_FUNCTIONAL_ASSESSMENT: PREVENTS OR INTERFERES SOME ACTIVE ACTIVITIES AND ADLS

## 2024-02-16 ASSESSMENT — PAIN DESCRIPTION - PAIN TYPE
TYPE: SURGICAL PAIN
TYPE: SURGICAL PAIN

## 2024-02-16 ASSESSMENT — PAIN DESCRIPTION - FREQUENCY
FREQUENCY: CONTINUOUS
FREQUENCY: CONTINUOUS

## 2024-02-16 NOTE — PROGRESS NOTES
Occupational Therapy  Facility/Department: [facility] SURG SERVICES  Occupational Therapy Initial Assessment    Name: Dilcia Fraire  : 1950  MRN: [id number]  Date of Service: 2024    Discharge Recommendations:  Patient would benefit from continued therapy after discharge          Patient Diagnosis(es): The encounter diagnosis was Primary osteoarthritis of right knee.  Past Medical History:  has a past medical history of Arthritis, Chronic kidney disease, Colon polyps, Constipation, Diverticulosis, Hyperlipidemia, Hypertension, Hypothyroidism, and Urinary urgency.  Past Surgical History:  has a past surgical history that includes Colonoscopy (2009); Tubal ligation; Colonoscopy (2014); joint replacement (Left); Colonoscopy (N/A, 2021); Cataract extraction, extracapsular w/ intraocular lens implant (Bilateral); skin biopsy (Left); and Total knee arthroplasty (Right, 2024).    Treatment Diagnosis: Primary osteoarthritis of R knee      Assessment   Assessment: Pt. with limited ability to bear weight on RLE today  Activity Tolerance  Activity Tolerance: Patient limited by pain        Plan   Occupational Therapy Plan  Times Per Week: 3-5 times per week  Times Per Day: Once a day  Current Treatment Recommendations: Strengthening, Balance training, Functional mobility training, Endurance training, Self-Care / ADL, Safety education & training, Positioning     Restrictions  Restrictions/Precautions  Restrictions/Precautions: Fall Risk, Weight Bearing  Lower Extremity Weight Bearing Restrictions  Right Lower Extremity Weight Bearing: Weight Bearing As Tolerated    Subjective   General  Chart Reviewed: Yes  Patient assessed for rehabilitation services?: Yes  Family / Caregiver Present: No     Social/Functional History  Social/Functional History  Lives With: Spouse, Family (and grandson and great grandson)  Type of Home: House  Home Layout: Two level, Able to Live on Main level with  Access: Stairs to enter without rails  Entrance Stairs - Number of Steps: 1 RONNIE  Bathroom Shower/Tub: Walk-in shower  Bathroom Toilet: Handicap height  Bathroom Equipment: Shower chair, 3-in-1 commode, Grab bars in shower  Home Equipment: Crutches, Walker, 4 wheeled  ADL Assistance: Independent  Ambulation Assistance: Independent  Transfer Assistance: Independent  Active : Yes       Objective   Pulse: 79  Heart Rate Source: Monitor  BP: (!) 109/45  BP Location: Right upper arm  Patient Position: Sitting  MAP (Calculated): 66  Respirations: 20  SpO2: 93 %  O2 Device: None (Room air)  Temp: 97.5 °F (36.4 °C)                                  Bed mobility  Supine to Sit: Minimal assistance  Transfers  Stand Step Transfers: Contact guard assistance;Minimal assistance  Sit to stand: Contact guard assistance  Transfer Comments: Intially CGA for walking to bathroom but soon became Rogelio due to limited weight bearing tolerance on surgical leg                                                 G-Code     OutComes Score                                                  AM-PAC - ADL       Tinneti Score       Goals  Short Term Goals  Time Frame for Short Term Goals: 1-2 weeks  Short Term Goal 1: Patient will be independent with positioning strategies for knee extension, AE/DME options, and home safety.  Short Term Goal 2: The patient will demo no loss of balance during light ambulatory ADL  Short Term Goal 3: Pt will complete all aspects of toileting with mod I.       Therapy Time   Individual Concurrent Group Co-treatment   Time In           Time Out           Minutes                   Pato Eden OT

## 2024-02-16 NOTE — PROGRESS NOTES
Subjective:     Post-Operative Day: 2 Status Post right TKA. Pt is awake and alert. Ox3. Doing better this afternoon.  No new issues.  Systemic or Specific Complaints: Pain Control is better  no nausea Pain 3    Objective:     Patient Vitals for the past 24 hrs:   BP Temp Temp src Pulse Resp SpO2   02/16/24 1124 127/75 97.2 °F (36.2 °C) Temporal 92 14 97 %   02/16/24 0819 (!) 109/45 97.5 °F (36.4 °C) Temporal 79 20 93 %   02/16/24 0818 -- -- -- -- -- 93 %   02/16/24 0521 (!) 111/45 (!) 95.9 °F (35.5 °C) -- 73 16 97 %   02/16/24 0210 -- -- -- -- 16 --   02/16/24 0051 -- -- -- -- 16 --   02/16/24 0022 (!) 114/45 96.8 °F (36 °C) -- 77 16 95 %   02/15/24 2213 -- -- -- -- -- 95 %   02/15/24 2155 -- -- -- -- 16 --   02/15/24 2105 104/64 98.8 °F (37.1 °C) Temporal 82 18 92 %   02/15/24 1725 (!) 121/56 98.6 °F (37 °C) Temporal 82 20 94 %   02/15/24 1630 (!) 119/45 98.2 °F (36.8 °C) Temporal 77 14 98 %       General: alert, appears stated age, and cooperative   Exam: Incision clean, dry, and intact, no evidence of infection. Good active motion to the right lower extremity.   Neurovascular: Exam normal       Data Review:  Recent Labs     02/15/24  0211 02/16/24 0317   HGB 10.5* 10.2*     Recent Labs     02/16/24 0317      K 3.7   CREATININE 0.9     Recent Labs     02/16/24 0317   LABGLOM >60           Assessment:     Status Post right tka.      Plan:     Pt will continue with her current nursing cares and therapy as tolerated. Plan for home tomorrow with home health.    POSTOPERATIVE PLANS:  1.  The patient will be on our typical postop protocol.  2.  She will be on 48 hours of vancomycin and 2 doses of Ancef.  3.  She will be on Xarelto 10 mg a day for 21 days followed by baby  aspirin 81 mg twice a day for another 3 weeks.     Please note, prior to surgery, range of motion was 24 to 126 degrees  prior to surgery.      Electronically signed by Cristofer Rivas PA-C on 2/16/2024 at 1:01 PM

## 2024-02-16 NOTE — PROGRESS NOTES
Patient discharged home today with [facility].  Went over all discharge instructions and new medications with patient and provided a copy of all new medications to take home.  Patient verbalized understanding.  Patient was stable upon discharge.  Electronically signed by Francisca Olvera RN on 2/16/2024 at 12:15 PM

## 2024-02-16 NOTE — PROGRESS NOTES
Patient was retaining urine tonight, bladder scanned 800ml.  Patient was in/out   cath 700,  will monitor her output.

## 2024-02-16 NOTE — PROGRESS NOTES
Physical Therapy  Name: Dilcia Fraire  MRN:  [id number]  Date of service:  2/16/2024 02/16/24 1127   Restrictions/Precautions   Restrictions/Precautions Fall Risk;Weight Bearing   Lower Extremity Weight Bearing Restrictions   Right Lower Extremity Weight Bearing Weight Bearing As Tolerated   Subjective   Subjective Pt agreed to therapy.   Pain Assessment   Pain Assessment 0-10   Pain Level 8   Pain Location Knee   Pain Orientation Right   Pain Descriptors Aching   Functional Pain Assessment Prevents or interferes some active activities and ADLs   Pain Type Surgical pain   Pain Frequency Continuous   Non-Pharmaceutical Pain Intervention(s) Ambulation/Increased Activity;Repositioned;Rest;Cold pack   Response to Pain Intervention Patient satisfied  (pt was nearing the end of pain medicine, instructed to call when time for next meds)   Bed Mobility   Supine to Sit Minimal assistance   Transfers   Sit to Stand Minimal Assistance   Stand to Sit Minimal Assistance   Ambulation   WB Status FWBAT RLE   Ambulation   Surface Level tile   Device Rolling Walker   Assistance Minimal assistance;2 Person assistance   Quality of Gait knee beginning to buckle towards end of distance, flexed posture, v/c's to offload through UE's   Gait Deviations Slow Jenny;Decreased step length;Decreased step height   Distance 10' x 2   Comments v/c's for safety to turn fully before sitting using RW   Patient Goals    Patient Goals  go home tomorrow   Short Term Goals   Time Frame for Short Term Goals 2 wk   Short Term Goal 1 supine to sit indep   Short Term Goal 2 sit to stand indep   Short Term Goal 3 amb. 100' with RW SBA   Short Term Goal 4 bed to chair SBA   Short Term Goal 5 up/down 2-3 stairs with rail SBA   Conditions Requiring Skilled Therapeutic Intervention   Body Structures, Functions, Activity Limitations Requiring Skilled Therapeutic Intervention Decreased functional mobility ;Decreased ADL status;Decreased ROM;Decreased safe

## 2024-02-16 NOTE — PROGRESS NOTES
[facility]  Daily Progress Note  Dilcia Fraire  MRN: [id number] LOS: 0    Admit Date: 2024 6:49 AM          Chief Complaint:  Right knee pain      Interval History:    Reviewed overnight events and nursing notes.   Status:  improved  Pain:  some relief  Gen: No fevers  HEENT: No migraine or visual change  Lung: No cough, hemopotysis or wheezing  Cv: No chest pain or pressure  Abd: No nausea or vomit, no change in bowel fct, no gi bleeding      DIET:  ADULT DIET; Regular    Medications:      sodium chloride 150 mL/hr at 02/15/24 1121    sodium chloride        polyethylene glycol  17 g Oral BID    levothyroxine  150 mcg Oral Nightly    rosuvastatin  5 mg Oral Nightly    cetirizine  10 mg Oral Nightly    vitamin B-12  500 mcg Oral Nightly    therapeutic multivitamin-minerals  1 tablet Oral Nightly    sodium chloride flush  5-40 mL IntraVENous 2 times per day    acetaminophen  650 mg Oral Q6H    ceFAZolin (ANCEF) IVPB  2,000 mg IntraVENous Q8H    bisacodyl  5 mg Oral Daily    traMADol  50 mg Oral Q6H    vancomycin  1,000 mg IntraVENous Q12H    rivaroxaban  10 mg Oral Daily       Data:     Code Status: Full Code    Family History   Problem Relation Age of Onset    Colon Cancer Neg Hx     Colon Polyps Neg Hx     Esophageal Cancer Neg Hx     Liver Cancer Neg Hx     Liver Disease Neg Hx     Stomach Cancer Neg Hx      Social History     Socioeconomic History    Marital status:      Spouse name: Not on file    Number of children: Not on file    Years of education: Not on file    Highest education level: Not on file   Occupational History    Not on file   Tobacco Use    Smoking status: Former     Current packs/day: 0.00     Types: Cigarettes     Quit date: 1990     Years since quittin.6    Smokeless tobacco: Never   Vaping Use    Vaping Use: Never used   Substance and Sexual Activity    Alcohol use: Not Currently     Comment: social    Drug use: Never    Sexual activity: Not on file

## 2024-02-16 NOTE — PROGRESS NOTES
Physical Therapy  Name: Dilcia Fraire  MRN:  [id number]  Date of service:  2/16/2024 02/16/24 1513   Restrictions/Precautions   Restrictions/Precautions Fall Risk;Weight Bearing   Lower Extremity Weight Bearing Restrictions   Right Lower Extremity Weight Bearing Weight Bearing As Tolerated   Subjective   Subjective Pt agreed to therapy.   General Comment   Comments Pt had received pain meds about 20 minutes ago. Agreeable to therapy.   Pain Assessment   Pain Assessment 0-10   Pain Level 6  (with mobility)   Pain Location Knee   Pain Orientation Right   Pain Descriptors Aching   Functional Pain Assessment Prevents or interferes some active activities and ADLs   Pain Type Surgical pain   Pain Frequency Continuous   Non-Pharmaceutical Pain Intervention(s) Ambulation/Increased Activity;Cold pack;Repositioned;Rest   Response to Pain Intervention Patient satisfied   Bed Mobility   Supine to Sit Minimal assistance   Transfers   Sit to Stand Contact guard assistance   Stand to Sit Contact guard assistance   Ambulation   WB Status FWBAT RLE   Ambulation   Surface Level tile   Device Rolling Walker   Assistance Minimal assistance   Quality of Gait no overt buckling but maintains flexed knee majority of amb   Gait Deviations Slow Jenny;Decreased step length;Decreased step height   Distance 10' 15'   Patient Goals    Patient Goals  go home tomorrow   Short Term Goals   Time Frame for Short Term Goals 2 wk   Short Term Goal 1 supine to sit indep   Short Term Goal 2 sit to stand indep   Short Term Goal 3 amb. 100' with RW SBA   Short Term Goal 4 bed to chair SBA   Short Term Goal 5 up/down 2-3 stairs with rail SBA   Conditions Requiring Skilled Therapeutic Intervention   Body Structures, Functions, Activity Limitations Requiring Skilled Therapeutic Intervention Decreased functional mobility ;Decreased ADL status;Decreased ROM;Decreased safe awareness;Decreased balance;Increased pain;Decreased posture   Assessment Pt able to

## 2024-02-16 NOTE — PLAN OF CARE
Problem: Discharge Planning  Goal: Discharge to home or other facility with appropriate resources  2/16/2024 1200 by Pearl Reyes RN  Outcome: Progressing  Flowsheets (Taken 2/16/2024 0819)  Discharge to home or other facility with appropriate resources:   Identify barriers to discharge with patient and caregiver   Arrange for needed discharge resources and transportation as appropriate   Identify discharge learning needs (meds, wound care, etc)   Arrange for interpreters to assist at discharge as needed   Refer to discharge planning if patient needs post-hospital services based on physician order or complex needs related to functional status, cognitive ability or social support system  2/16/2024 0129 by Liz Gilbert, RN  Outcome: Progressing  Flowsheets (Taken 2/16/2024 0125)  Discharge to home or other facility with appropriate resources: Identify barriers to discharge with patient and caregiver     Problem: ABCDS Injury Assessment  Goal: Absence of physical injury  2/16/2024 1200 by Pearl Reyes RN  Outcome: Progressing  Flowsheets (Taken 2/16/2024 1158)  Absence of Physical Injury: Implement safety measures based on patient assessment  2/16/2024 0129 by Liz Gilbert RN  Outcome: Progressing  Flowsheets (Taken 2/16/2024 0128)  Absence of Physical Injury: Implement safety measures based on patient assessment     Problem: Safety - Adult  Goal: Free from fall injury  2/16/2024 1200 by Pearl Reyes RN  Outcome: Progressing  Flowsheets (Taken 2/16/2024 1158)  Free From Fall Injury:   Instruct family/caregiver on patient safety   Based on caregiver fall risk screen, instruct family/caregiver to ask for assistance with transferring infant if caregiver noted to have fall risk factors  2/16/2024 0129 by Liz Gilbert, RN  Outcome: Progressing  Flowsheets (Taken 2/16/2024 0128)  Free From Fall Injury: Instruct family/caregiver on patient safety     Problem: Pain  Goal: Verbalizes/displays

## 2024-02-16 NOTE — CARE COORDINATION
Francisca Olvera RN Ortho Navigator  [phone]    Patient would like dme item (BSC) to be delivered to [facility] Room #531.  Patient is scheduled to discharge home today at lunch.  Please call if you have any questions.  Electronically signed by Francisca Olvera RN on 2/16/2024 at 9:35 AM  Dilcia Steward  2/14/2024 10:41 AM   Admission  Description: 73 year old female Department: [facility] Surg Services     Patient Demographics    Name Patient ID SSN Gender Identity Birth Date   Dilcia Steward [id number] Female [birth year] (73 yrs)     Address Phone Email     [address] [place] [id number] [phone] (M)   [phone] (H)   [phone] (W) [email or web address]       Reg Status PCP Date Last Verified Next Review Date    Verified Carrington Eden MD  [phone] 01/24/24 02/23/24      Insurance Payors as of 2/16/2024    MEDICARE    Plan: MEDICARE PART A Member: [id number] Effective from: 12/1/2013   Subscriber: DILCIA STEWARD Subscriber ID: [id number] Guarantor: DILCIA STEWARD     [facility]    [facility]AL Group: [id number] Member: [id number]   Effective from: 1/1/2016 Subscriber: LUDMILA STEWARD Subscriber ID: [id number]   Guarantor: DILCIA STEWARD     Patient Contacts    Name Relation Home Work Mobile   YeeLudmila Spouse [phone]     danie nguyen Child   [phone]   Electronically signed by Francisca Olvera RN on 2/16/2024 at 9:38 AM

## 2024-02-16 NOTE — PROGRESS NOTES
Patient discharged home today with [facility].  Facility notified of patient's discharge and all documents were faxed.  P ;  F .  Electronically signed by Francisca Olvera RN on 2/16/2024 at 12:15 PM

## 2024-02-16 NOTE — PROGRESS NOTES
CLINICAL PHARMACY NOTE: MEDS TO BEDS    Total # of Prescriptions Filled: 5   The following medications were delivered to the patient:  Current Discharge Medication List        START taking these medications    Details   ondansetron (ZOFRAN) 4 MG tablet Take 1 tablet by mouth every 8 hours as needed for Nausea or Vomiting  Qty: 30 tablet, Refills: 0      oxyCODONE HCl (OXY-IR) 10 MG immediate release tablet Take 1 tablet by mouth See Admin Instructions for 28 days. 1 to 2 tablets po q 3 to 4 hours prn  Qty: 90 tablet, Refills: 0    Comments: Reduce doses taken as pain becomes manageable  Associated Diagnoses: Primary osteoarthritis of right knee      cyclobenzaprine (FLEXERIL) 10 MG tablet Take 1 tablet by mouth 3 times daily as needed for Muscle spasms  Qty: 40 tablet, Refills: 0      apixaban (ELIQUIS) 2.5 MG TABS tablet Take 1 tablet by mouth 2 times daily  Qty: 42 tablet, Refills: 0      aspirin 81 MG EC tablet Take 1 tablet by mouth 2 times daily Do not start until Eliquis is completed  Qty: 42 tablet, Refills: 0               Additional Documentation:  Handed scripts to patients daughter at bedside. Paid with card.

## 2024-02-17 VITALS
SYSTOLIC BLOOD PRESSURE: 116 MMHG | DIASTOLIC BLOOD PRESSURE: 55 MMHG | WEIGHT: 184 LBS | HEIGHT: 66 IN | HEART RATE: 91 BPM | RESPIRATION RATE: 18 BRPM | OXYGEN SATURATION: 95 % | BODY MASS INDEX: 29.57 KG/M2 | TEMPERATURE: 97.2 F

## 2024-02-17 LAB
ANION GAP SERPL CALCULATED.3IONS-SCNC: 12 MMOL/L (ref 7–19)
BUN SERPL-MCNC: 11 MG/DL (ref 8–23)
CALCIUM SERPL-MCNC: 8.4 MG/DL (ref 8.8–10.2)
CHLORIDE SERPL-SCNC: 103 MMOL/L (ref 98–111)
CO2 SERPL-SCNC: 22 MMOL/L (ref 22–29)
CREAT SERPL-MCNC: 0.7 MG/DL (ref 0.5–0.9)
GLUCOSE SERPL-MCNC: 97 MG/DL (ref 74–109)
HCT VFR BLD AUTO: 30.7 % (ref 37–47)
HGB BLD-MCNC: 10 G/DL (ref 12–16)
POTASSIUM SERPL-SCNC: 3.6 MMOL/L (ref 3.5–5)
SODIUM SERPL-SCNC: 137 MMOL/L (ref 136–145)

## 2024-02-17 PROCEDURE — G0378 HOSPITAL OBSERVATION PER HR: HCPCS

## 2024-02-17 PROCEDURE — 85014 HEMATOCRIT: CPT

## 2024-02-17 PROCEDURE — 6370000000 HC RX 637 (ALT 250 FOR IP): Performed by: PHYSICIAN ASSISTANT

## 2024-02-17 PROCEDURE — 85018 HEMOGLOBIN: CPT

## 2024-02-17 PROCEDURE — 6370000000 HC RX 637 (ALT 250 FOR IP): Performed by: ORTHOPAEDIC SURGERY

## 2024-02-17 PROCEDURE — 1210000000 HC MED SURG R&B

## 2024-02-17 PROCEDURE — 94760 N-INVAS EAR/PLS OXIMETRY 1: CPT

## 2024-02-17 PROCEDURE — 97116 GAIT TRAINING THERAPY: CPT

## 2024-02-17 PROCEDURE — 36415 COLL VENOUS BLD VENIPUNCTURE: CPT

## 2024-02-17 PROCEDURE — 80048 BASIC METABOLIC PNL TOTAL CA: CPT

## 2024-02-17 RX ADMIN — TRAMADOL HYDROCHLORIDE 50 MG: 50 TABLET, COATED ORAL at 05:51

## 2024-02-17 RX ADMIN — OXYCODONE 5 MG: 5 TABLET ORAL at 05:52

## 2024-02-17 RX ADMIN — ACETAMINOPHEN 650 MG: 325 TABLET ORAL at 05:51

## 2024-02-17 RX ADMIN — BISACODYL 5 MG: 5 TABLET, COATED ORAL at 08:09

## 2024-02-17 RX ADMIN — OXYCODONE HYDROCHLORIDE 10 MG: 10 TABLET ORAL at 01:54

## 2024-02-17 RX ADMIN — POLYETHYLENE GLYCOL 3350 17 G: 17 POWDER, FOR SOLUTION ORAL at 08:09

## 2024-02-17 RX ADMIN — FERROUS SULFATE TAB 325 MG (65 MG ELEMENTAL FE) 325 MG: 325 (65 FE) TAB at 08:09

## 2024-02-17 NOTE — PROGRESS NOTES
Physical Therapy     02/17/24 1200   Restrictions/Precautions   Restrictions/Precautions Fall Risk;Weight Bearing   Lower Extremity Weight Bearing Restrictions   Right Lower Extremity Weight Bearing Weight Bearing As Tolerated   General   Diagnosis Primary OA R knee   Subjective   Subjective pt agreeable to tx   Subjective   Pain 6/10 with AMB   Bed mobility   Supine to Sit Minimal assistance   Transfers   Sit to Stand Contact guard assistance   Stand to Sit Contact guard assistance   Comment CGA from EOB/elevatiolet<>RW   Ambulation   Surface Level tile   Device Rolling Walker   Assistance Contact guard assistance   Quality of Gait no overt buckling but maintains flexed knee majority of amb   Gait Deviations Slow Jenny;Decreased step length;Decreased step height   Distance 10' 30' 20'   Stairs/Curb   Stairs? Yes   Stairs   # Steps  2   Stairs Height 6\"   Rails Bilateral   Assistance Minimal assistance   Comment pt daughter present to educate on correct hand hold/use of gait belt for steps at home without handrail. daughter stated that grandson would be at home to assist up the steps.   Short Term Goals   Time Frame for Short Term Goals 2 wk   Short Term Goal 1 supine to sit indep   Short Term Goal 2 sit to stand indep   Short Term Goal 3 amb. 100' with RW SBA   Short Term Goal 4 bed to chair SBA   Short Term Goal 5 up/down 2-3 stairs with rail SBA   Activity Tolerance   Activity Tolerance Patient tolerated treatment well   Assessment   Assessment pt able to improve AMB distance and perform steps with no true knee buckling or LOB episode.   PT Plan of Care   Saturday X   Safety Devices   Type of Devices Gait belt;Call light within reach;Nurse notified     Electronically signed by Hardeep Mack PTA on 2/17/2024 at 12:56 PM

## 2024-02-17 NOTE — PROGRESS NOTES
Subjective:     Post-Operative Day: 3 Status Post right TKA. Pt is awake and alert. Ox3. Doing better this afternoon.  No new issues.  Systemic or Specific Complaints: Pain Control is better  no nausea Pain 3    Objective:     Patient Vitals for the past 24 hrs:   BP Temp Temp src Pulse Resp SpO2   02/17/24 0832 (!) 116/55 97.2 °F (36.2 °C) Temporal 91 18 95 %   02/17/24 0734 -- -- -- -- -- 94 %   02/17/24 0339 (!) 120/48 97.2 °F (36.2 °C) Temporal 82 18 93 %   02/17/24 0027 (!) 105/48 96.8 °F (36 °C) Temporal 83 18 95 %   02/16/24 2249 -- -- -- -- 16 --   02/16/24 2246 -- -- -- -- 16 --   02/16/24 1937 120/66 97.2 °F (36.2 °C) -- 92 16 92 %   02/16/24 1906 -- -- -- -- 16 --   02/16/24 1905 -- -- -- -- 16 --   02/16/24 1835 -- -- -- -- 18 --   02/16/24 1626 -- -- -- (!) 133 -- (!) 76 %   02/16/24 1611 114/61 97.5 °F (36.4 °C) Temporal 92 16 93 %   02/16/24 1420 -- -- -- -- 18 --   02/16/24 1124 127/75 97.2 °F (36.2 °C) Temporal 92 14 97 %       General: alert, appears stated age, and cooperative   Exam: Incision clean, dry, and intact, no evidence of infection. Good active motion to the right lower extremity.   Neurovascular: Exam normal       Data Review:  Recent Labs     02/16/24 0317 02/17/24 0337   HGB 10.2* 10.0*     Recent Labs     02/17/24 0337      K 3.6   CREATININE 0.7     Recent Labs     02/17/24 0337   LABGLOM >60           Assessment:     Status Post right tka.      Plan:     Pt will continue with her current nursing cares and therapy as tolerated. Plan for home  with home health.    POSTOPERATIVE PLANS:  1.  The patient will be on our typical postop protocol.  2.  She will be on 48 hours of vancomycin and 2 doses of Ancef.  3.  She will be on Xarelto 10 mg a day for 21 days followed by baby  aspirin 81 mg twice a day for another 3 weeks.     Please note, prior to surgery, range of motion was 24 to 126 degrees  prior to surgery.      Electronically signed by Uriah Booker MD on 2/17/2024 at  10:09 AM

## 2024-02-17 NOTE — DISCHARGE SUMMARY
[facility] [place] [facility]  Discharge Summary    The Dilcia Fraire is a 73 y.o. female underwent R TKA procedure without complication.  Dilcia Fraire was admitted to the floor following her   recovery in the PACU.     Discharge Diagnosis  right knee djd    Current Inpatient Medications    Current Facility-Administered Medications: ferrous sulfate (IRON 325) tablet 325 mg, 325 mg, Oral, BID WC  cloNIDine (CATAPRES) tablet 0.1 mg, 0.1 mg, Oral, Q4H PRN  polyethylene glycol (GLYCOLAX) packet 17 g, 17 g, Oral, BID  levothyroxine (SYNTHROID) tablet 150 mcg, 150 mcg, Oral, Nightly  rosuvastatin (CRESTOR) tablet 5 mg, 5 mg, Oral, Nightly  cetirizine (ZYRTEC) tablet 10 mg, 10 mg, Oral, Nightly  vitamin B-12 (CYANOCOBALAMIN) tablet 500 mcg, 500 mcg, Oral, Nightly  therapeutic multivitamin-minerals 1 tablet, 1 tablet, Oral, Nightly  0.9 % sodium chloride infusion, , IntraVENous, Continuous  sodium chloride 0.9 % bolus 500 mL, 500 mL, IntraVENous, PRN  sodium chloride flush 0.9 % injection 5-40 mL, 5-40 mL, IntraVENous, 2 times per day  sodium chloride flush 0.9 % injection 5-40 mL, 5-40 mL, IntraVENous, PRN  0.9 % sodium chloride infusion, , IntraVENous, PRN  acetaminophen (TYLENOL) tablet 650 mg, 650 mg, Oral, Q6H  ondansetron (ZOFRAN-ODT) disintegrating tablet 4 mg, 4 mg, Oral, Q8H PRN **OR** ondansetron (ZOFRAN) injection 4 mg, 4 mg, IntraVENous, Q6H PRN  bisacodyl (DULCOLAX) EC tablet 5 mg, 5 mg, Oral, Daily  diphenhydrAMINE (BENADRYL) tablet 25 mg, 25 mg, Oral, Q6H PRN **OR** diphenhydrAMINE (BENADRYL) injection 25 mg, 25 mg, IntraVENous, Q6H PRN  cyclobenzaprine (FLEXERIL) tablet 10 mg, 10 mg, Oral, Q12H PRN  oxyCODONE (ROXICODONE) immediate release tablet 5 mg, 5 mg, Oral, Q4H PRN **OR** oxyCODONE HCl (OXY-IR) immediate release tablet 10 mg, 10 mg, Oral, Q4H PRN **OR** oxyCODONE (ROXICODONE) immediate release tablet 15 mg, 15 mg, Oral, Q4H PRN  HYDROmorphone HCl PF (DILAUDID) injection 0.25 mg, 0.25 mg,

## 2024-02-23 ENCOUNTER — TELEPHONE (OUTPATIENT)
Dept: INPATIENT UNIT | Age: 74
End: 2024-02-23

## 2024-04-08 ENCOUNTER — TRANSCRIBE ORDERS (OUTPATIENT)
Dept: ADMINISTRATIVE | Facility: HOSPITAL | Age: 74
End: 2024-04-08
Payer: COMMERCIAL

## 2024-04-08 DIAGNOSIS — Z12.31 ENCOUNTER FOR SCREENING MAMMOGRAM FOR MALIGNANT NEOPLASM OF BREAST: Primary | ICD-10-CM

## 2024-06-12 ENCOUNTER — HOSPITAL ENCOUNTER (OUTPATIENT)
Dept: MAMMOGRAPHY | Facility: HOSPITAL | Age: 74
Discharge: HOME | End: 2024-06-12
Admitting: FAMILY MEDICINE
Payer: COMMERCIAL

## 2024-06-12 DIAGNOSIS — Z12.31 ENCOUNTER FOR SCREENING MAMMOGRAM FOR MALIGNANT NEOPLASM OF BREAST: ICD-10-CM

## 2024-06-12 PROCEDURE — 77067 SCR MAMMO BI INCL CAD: CPT

## 2024-06-12 PROCEDURE — 77063 BREAST TOMOSYNTHESIS BI: CPT

## 2024-08-13 LAB
ALBUMIN SERPL-MCNC: 4.1 G/DL (ref 3.5–5.2)
ALP SERPL-CCNC: 112 U/L (ref 35–104)
ALT SERPL-CCNC: 11 U/L (ref 5–33)
ANION GAP SERPL CALCULATED.3IONS-SCNC: 16 MMOL/L (ref 7–19)
AST SERPL-CCNC: 17 U/L (ref 5–32)
BASOPHILS # BLD: 0 K/UL (ref 0–0.2)
BASOPHILS NFR BLD: 0.4 % (ref 0–1)
BILIRUB SERPL-MCNC: 0.4 MG/DL (ref 0.2–1.2)
BUN SERPL-MCNC: 17 MG/DL (ref 8–23)
CALCIUM SERPL-MCNC: 9.7 MG/DL (ref 8.8–10.2)
CHLORIDE SERPL-SCNC: 101 MMOL/L (ref 98–111)
CHOLEST SERPL-MCNC: 176 MG/DL (ref 160–199)
CO2 SERPL-SCNC: 25 MMOL/L (ref 22–29)
CREAT SERPL-MCNC: 1.1 MG/DL (ref 0.5–0.9)
EOSINOPHIL # BLD: 0.3 K/UL (ref 0–0.6)
EOSINOPHIL NFR BLD: 3.7 % (ref 0–5)
ERYTHROCYTE [DISTWIDTH] IN BLOOD BY AUTOMATED COUNT: 12.6 % (ref 11.5–14.5)
GLUCOSE SERPL-MCNC: 85 MG/DL (ref 74–109)
HCT VFR BLD AUTO: 38.8 % (ref 37–47)
HDLC SERPL-MCNC: 44 MG/DL (ref 65–121)
HGB BLD-MCNC: 12.6 G/DL (ref 12–16)
IMM GRANULOCYTES # BLD: 0 K/UL
LDLC SERPL CALC-MCNC: 86 MG/DL
LYMPHOCYTES # BLD: 2.4 K/UL (ref 1.1–4.5)
LYMPHOCYTES NFR BLD: 33.2 % (ref 20–40)
MCH RBC QN AUTO: 30.1 PG (ref 27–31)
MCHC RBC AUTO-ENTMCNC: 32.5 G/DL (ref 33–37)
MCV RBC AUTO: 92.6 FL (ref 81–99)
MONOCYTES # BLD: 0.6 K/UL (ref 0–0.9)
MONOCYTES NFR BLD: 8.8 % (ref 0–10)
NEUTROPHILS # BLD: 3.9 K/UL (ref 1.5–7.5)
NEUTS SEG NFR BLD: 53.5 % (ref 50–65)
PLATELET # BLD AUTO: 244 K/UL (ref 130–400)
PMV BLD AUTO: 10.6 FL (ref 9.4–12.3)
POTASSIUM SERPL-SCNC: 4.2 MMOL/L (ref 3.5–5)
PROT SERPL-MCNC: 6.8 G/DL (ref 6.6–8.7)
RBC # BLD AUTO: 4.19 M/UL (ref 4.2–5.4)
SODIUM SERPL-SCNC: 142 MMOL/L (ref 136–145)
T4 FREE SERPL-MCNC: 0.98 NG/DL (ref 0.93–1.7)
TRIGL SERPL-MCNC: 231 MG/DL (ref 0–149)
TSH SERPL DL<=0.005 MIU/L-ACNC: 7.38 UIU/ML (ref 0.27–4.2)
WBC # BLD AUTO: 7.3 K/UL (ref 4.8–10.8)

## 2024-11-19 LAB
T4 FREE SERPL-MCNC: 1.08 NG/DL (ref 0.93–1.7)
TSH SERPL DL<=0.005 MIU/L-ACNC: 3.14 UIU/ML (ref 0.27–4.2)

## 2025-03-17 ENCOUNTER — TRANSCRIBE ORDERS (OUTPATIENT)
Dept: ADMINISTRATIVE | Facility: HOSPITAL | Age: 75
End: 2025-03-17
Payer: COMMERCIAL

## 2025-03-17 DIAGNOSIS — Z12.31 ENCOUNTER FOR SCREENING MAMMOGRAM FOR MALIGNANT NEOPLASM OF BREAST: Primary | ICD-10-CM

## 2025-07-02 LAB
ALBUMIN SERPL-MCNC: 4.3 G/DL (ref 3.5–5.2)
ALP SERPL-CCNC: 116 U/L (ref 35–104)
ALT SERPL-CCNC: 22 U/L (ref 10–35)
ANION GAP SERPL CALCULATED.3IONS-SCNC: 13 MMOL/L (ref 8–16)
AST SERPL-CCNC: 24 U/L (ref 10–35)
BASOPHILS # BLD: 0.1 K/UL (ref 0–0.2)
BASOPHILS NFR BLD: 0.7 % (ref 0–1)
BILIRUB SERPL-MCNC: 0.3 MG/DL (ref 0.2–1.2)
BUN SERPL-MCNC: 25 MG/DL (ref 8–23)
CALCIUM SERPL-MCNC: 9.6 MG/DL (ref 8.8–10.2)
CHLORIDE SERPL-SCNC: 102 MMOL/L (ref 98–107)
CHOLEST SERPL-MCNC: 170 MG/DL (ref 0–199)
CO2 SERPL-SCNC: 24 MMOL/L (ref 22–29)
CREAT SERPL-MCNC: 1.1 MG/DL (ref 0.5–0.9)
EOSINOPHIL # BLD: 0.3 K/UL (ref 0–0.6)
EOSINOPHIL NFR BLD: 2.9 % (ref 0–5)
ERYTHROCYTE [DISTWIDTH] IN BLOOD BY AUTOMATED COUNT: 12.4 % (ref 11.5–14.5)
GLUCOSE SERPL-MCNC: 106 MG/DL (ref 70–99)
HCT VFR BLD AUTO: 42.5 % (ref 37–47)
HDLC SERPL-MCNC: 48 MG/DL (ref 40–60)
HGB BLD-MCNC: 14 G/DL (ref 12–16)
IMM GRANULOCYTES # BLD: 0 K/UL
LDLC SERPL CALC-MCNC: 80 MG/DL
LYMPHOCYTES # BLD: 3.3 K/UL (ref 1.1–4.5)
LYMPHOCYTES NFR BLD: 38.7 % (ref 20–40)
MCH RBC QN AUTO: 29.7 PG (ref 27–31)
MCHC RBC AUTO-ENTMCNC: 32.9 G/DL (ref 33–37)
MCV RBC AUTO: 90 FL (ref 81–99)
MONOCYTES # BLD: 1 K/UL (ref 0–0.9)
MONOCYTES NFR BLD: 12 % (ref 0–10)
NEUTROPHILS # BLD: 3.9 K/UL (ref 1.5–7.5)
NEUTS SEG NFR BLD: 45.4 % (ref 50–65)
PLATELET # BLD AUTO: 254 K/UL (ref 130–400)
PMV BLD AUTO: 10.6 FL (ref 9.4–12.3)
POTASSIUM SERPL-SCNC: 3.9 MMOL/L (ref 3.5–5.1)
PROT SERPL-MCNC: 7 G/DL (ref 6.4–8.3)
RBC # BLD AUTO: 4.72 M/UL (ref 4.2–5.4)
SODIUM SERPL-SCNC: 139 MMOL/L (ref 136–145)
T4 FREE SERPL-MCNC: 1.27 NG/DL (ref 0.93–1.7)
TRIGL SERPL-MCNC: 209 MG/DL (ref 0–149)
TSH SERPL DL<=0.005 MIU/L-ACNC: 1.88 UIU/ML (ref 0.27–4.2)
WBC # BLD AUTO: 8.6 K/UL (ref 4.8–10.8)

## (undated) DEVICE — ADHESIVE SKIN CLOSURE WND 8.661X1.5 IN 22 CM LIQUIBAND SECUR

## (undated) DEVICE — SURGICAL PROCEDURE PACK KNEE TOT DBD CDS LOURDES HOSP LF

## (undated) DEVICE — DRESSING FOAM SELF ADH 20X10 CM ABSORBENT MEPILEX BORDER

## (undated) DEVICE — SOLUTION IRRIG 3000ML 0.9% SOD CHL USP UROMATIC PLAS CONT

## (undated) DEVICE — GOWN,PREVENTION PLUS,XL,ST,24/CS: Brand: MEDLINE

## (undated) DEVICE — SUTURE VCRL SZ 2-0 L36IN ABSRB UD L36MM CT-1 1/2 CIR J945H

## (undated) DEVICE — DUAL CUT SAGITTAL BLADE

## (undated) DEVICE — GLOVE SURG SZ 85 CRM LTX FREE POLYISOPRENE POLYMER BEAD ANTI

## (undated) DEVICE — SHEET,DRAPE,53X77,STERILE: Brand: MEDLINE

## (undated) DEVICE — ROYAL SILK SURGICAL GOWN, XXL: Brand: CONVERTORS

## (undated) DEVICE — PIN FIX STERILE L150MM DIA3.2MM FLUT

## (undated) DEVICE — INSTRUMENT KIT ORTHOPEDIC KNEE NAVITRACK

## (undated) DEVICE — UNDERGLOVE SURG SZ 8 FNGR THK0.21MIL GRN LTX BEAD CUF

## (undated) DEVICE — NEPTUNE E-SEP SMOKE EVACUATION PENCIL, COATED, 70MM BLADE, ROCKER SWITCH: Brand: NEPTUNE E-SEP

## (undated) DEVICE — PREP RAZOR: Brand: DEROYAL

## (undated) DEVICE — E-Z CLEAN, NON-STICK, PTFE COATED, ELECTROSURGICAL BLADE ELECTRODE, MODIFIED EXTENDED INSULATION, 4 INCH (10.2 CM): Brand: MEGADYNE

## (undated) DEVICE — PAD,NON-ADHERENT,3X8,STERILE,LF,1/PK: Brand: MEDLINE

## (undated) DEVICE — FAN SPRAY KIT: Brand: PULSAVAC®

## (undated) DEVICE — GLOVE SURG SZ 85 L12IN FNGR THK79MIL GRN LTX FREE

## (undated) DEVICE — GLOVE SURG SZ 75 CRM LTX FREE POLYISOPRENE POLYMER BEAD ANTI

## (undated) DEVICE — PIN FIX STERILE L80MM DIA3.2MM FLUT CAS

## (undated) DEVICE — ENDO KIT,LOURDES HOSPITAL: Brand: MEDLINE INDUSTRIES, INC.

## (undated) DEVICE — SUTURE VCRL SZ 1 L18IN ABSRB UD L36MM CT-1 1/2 CIR J841D

## (undated) DEVICE — ROSA RBTC UNT 20 DROP

## (undated) DEVICE — ZIMMER® STERILE DISPOSABLE TOURNIQUET CUFF WITH PLC, DUAL PORT, SINGLE BLADDER, 34 IN. (86 CM)

## (undated) DEVICE — 3M™ STERI-DRAPE™ INSTRUMENT POUCH 1018: Brand: STERI-DRAPE™